# Patient Record
Sex: MALE | Race: WHITE | NOT HISPANIC OR LATINO | Employment: UNEMPLOYED | URBAN - METROPOLITAN AREA
[De-identification: names, ages, dates, MRNs, and addresses within clinical notes are randomized per-mention and may not be internally consistent; named-entity substitution may affect disease eponyms.]

---

## 2022-07-02 ENCOUNTER — APPOINTMENT (OUTPATIENT)
Dept: LAB | Facility: CLINIC | Age: 57
End: 2022-07-02
Payer: COMMERCIAL

## 2022-07-02 DIAGNOSIS — Z00.00 ROUTINE GENERAL MEDICAL EXAMINATION AT A HEALTH CARE FACILITY: ICD-10-CM

## 2022-07-02 LAB
ALBUMIN SERPL BCP-MCNC: 3.6 G/DL (ref 3.5–5)
ALP SERPL-CCNC: 61 U/L (ref 46–116)
ALT SERPL W P-5'-P-CCNC: 21 U/L (ref 12–78)
ANION GAP SERPL CALCULATED.3IONS-SCNC: 4 MMOL/L (ref 4–13)
AST SERPL W P-5'-P-CCNC: 21 U/L (ref 5–45)
BASOPHILS # BLD AUTO: 0.09 THOUSANDS/ΜL (ref 0–0.1)
BASOPHILS NFR BLD AUTO: 1 % (ref 0–1)
BILIRUB SERPL-MCNC: 1.1 MG/DL (ref 0.2–1)
BUN SERPL-MCNC: 18 MG/DL (ref 5–25)
CALCIUM SERPL-MCNC: 8.8 MG/DL (ref 8.3–10.1)
CHLORIDE SERPL-SCNC: 108 MMOL/L (ref 100–108)
CHOLEST SERPL-MCNC: 265 MG/DL
CO2 SERPL-SCNC: 28 MMOL/L (ref 21–32)
CREAT SERPL-MCNC: 0.93 MG/DL (ref 0.6–1.3)
EOSINOPHIL # BLD AUTO: 0.43 THOUSAND/ΜL (ref 0–0.61)
EOSINOPHIL NFR BLD AUTO: 4 % (ref 0–6)
ERYTHROCYTE [DISTWIDTH] IN BLOOD BY AUTOMATED COUNT: 13.9 % (ref 11.6–15.1)
GFR SERPL CREATININE-BSD FRML MDRD: 92 ML/MIN/1.73SQ M
GLUCOSE P FAST SERPL-MCNC: 85 MG/DL (ref 65–99)
HCT VFR BLD AUTO: 44.7 % (ref 36.5–49.3)
HDLC SERPL-MCNC: 38 MG/DL
HGB BLD-MCNC: 15 G/DL (ref 12–17)
IMM GRANULOCYTES # BLD AUTO: 0.03 THOUSAND/UL (ref 0–0.2)
IMM GRANULOCYTES NFR BLD AUTO: 0 % (ref 0–2)
LDLC SERPL CALC-MCNC: 204 MG/DL (ref 0–100)
LYMPHOCYTES # BLD AUTO: 4.67 THOUSANDS/ΜL (ref 0.6–4.47)
LYMPHOCYTES NFR BLD AUTO: 38 % (ref 14–44)
MCH RBC QN AUTO: 29.3 PG (ref 26.8–34.3)
MCHC RBC AUTO-ENTMCNC: 33.6 G/DL (ref 31.4–37.4)
MCV RBC AUTO: 87 FL (ref 82–98)
MONOCYTES # BLD AUTO: 1.44 THOUSAND/ΜL (ref 0.17–1.22)
MONOCYTES NFR BLD AUTO: 12 % (ref 4–12)
NEUTROPHILS # BLD AUTO: 5.58 THOUSANDS/ΜL (ref 1.85–7.62)
NEUTS SEG NFR BLD AUTO: 45 % (ref 43–75)
NONHDLC SERPL-MCNC: 227 MG/DL
NRBC BLD AUTO-RTO: 0 /100 WBCS
PLATELET # BLD AUTO: 281 THOUSANDS/UL (ref 149–390)
PMV BLD AUTO: 11 FL (ref 8.9–12.7)
POTASSIUM SERPL-SCNC: 4.4 MMOL/L (ref 3.5–5.3)
PROT SERPL-MCNC: 7 G/DL (ref 6.4–8.2)
RBC # BLD AUTO: 5.12 MILLION/UL (ref 3.88–5.62)
SODIUM SERPL-SCNC: 140 MMOL/L (ref 136–145)
TRIGL SERPL-MCNC: 117 MG/DL
WBC # BLD AUTO: 12.24 THOUSAND/UL (ref 4.31–10.16)

## 2022-07-02 PROCEDURE — 84445 ASSAY OF TSI GLOBULIN: CPT

## 2022-07-02 PROCEDURE — 36415 COLL VENOUS BLD VENIPUNCTURE: CPT

## 2022-07-02 PROCEDURE — 85025 COMPLETE CBC W/AUTO DIFF WBC: CPT

## 2022-07-02 PROCEDURE — 80053 COMPREHEN METABOLIC PANEL: CPT

## 2022-07-02 PROCEDURE — 80061 LIPID PANEL: CPT

## 2022-07-05 LAB — TSI SER-ACNC: <0.1 IU/L (ref 0–0.55)

## 2022-11-27 ENCOUNTER — HOSPITAL ENCOUNTER (OUTPATIENT)
Dept: RADIOLOGY | Facility: HOSPITAL | Age: 57
Discharge: HOME | End: 2022-11-27
Attending: INTERNAL MEDICINE

## 2022-11-27 DIAGNOSIS — M54.50 LUMBAR PAIN: ICD-10-CM

## 2023-01-17 ENCOUNTER — EVALUATION (OUTPATIENT)
Dept: PHYSICAL THERAPY | Facility: REHABILITATION | Age: 58
End: 2023-01-17

## 2023-01-17 DIAGNOSIS — M54.16 LUMBAR RADICULOPATHY, CHRONIC: Primary | ICD-10-CM

## 2023-01-17 NOTE — PROGRESS NOTES
PT Evaluation     Today's date: 2023  Patient name: Jorge Concepcion  : 1965  MRN: [id number]  Referring provider: Sho Ko MD  Dx:   Encounter Diagnosis     ICD-10-CM    1  Lumbar radiculopathy, chronic  M54 16         Start Time: 6463  Stop Time: 1275  Total time in clinic (min): 50 minutes  Assessment  Assessment details: Problem List:  1) Neural sensitivity  2) lumbar spine hypomobility    Jorge Concepcion is a pleasant 56 y o  male who presents with chronic back pain and lumbar radiculopathy  he has neural tension, and peritonealization with movement resulting in worry over not knowing what's wrong, concern at no signs of improvement, wanting to avoid surgery, fear of not being able to keep active and future ill health (and wanting to prevent it)  No further referral appears necessary at this time based upon examination results  I expect he will benefit from skilled physical therapy  Positive prognostic indicators include positive attitude toward recovery  Negative prognostic indicators include chronicity of symptoms, anxiety, high symptom irritability, degree of peripheralization, minimal changes in pain with movement and dependency on medications  Comparable signs:  1) Straight leg raises  2) Sitting 5 minutes  Impairments: abnormal muscle firing, abnormal or restricted ROM, activity intolerance, impaired physical strength, lacks appropriate home exercise program, pain with function and poor posture     Symptom irritability: highUnderstanding of Dx/Px/POC: good   Prognosis: good    Goals  Short Term Goals:   1  Patient will demonstrate independence with HEP by providing return demonstration of exercises  2  Patient will report decreased symptom intensity during activity by 50%  3  Patient will stand for 20 minutes before onset of symptoms  4  Patient will sit for 10-15 minutes before onset of symptoms    Long Term Goals:   1  Patient will improve FOTO to greater then goal  2  Patient will improve pain with activity to 2/10 or less  3  Patient will continue with HEP to allow for continued progress and function  4  Patient will report moderate pain after full workday  5  Patient will walk for 1 mile without onset of symptoms  Plan  Patient would benefit from: skilled physical therapy  Referral necessary: No  Planned modality interventions: biofeedback, TENS, traction, cryotherapy and thermotherapy: hydrocollator packs  Planned therapy interventions: joint mobilization, manual therapy, muscle pump exercises, neuromuscular re-education, patient education, strengthening, stretching, therapeutic activities, therapeutic exercise, home exercise program, graded exercise, graded activity, gait training, functional ROM exercises, flexibility, body mechanics training, balance, abdominal trunk stabilization, postural training and graded motor  Frequency: 2x week  Duration in visits: 12  Duration in weeks: 6  Treatment plan discussed with: patient and family        Subjective Evaluation    History of Present Illness  Mechanism of injury: Lilyan Opitz says he has been dealing with chronic pain in his back since he was in his 31's  He fell over 30 feet onto a sheet and tore the, "sheeting" on his sacroiliac joint  Currently, he says he gets pain from the top of his hip bones down to the bottom of his feet on both sides, and radiates up into his thoracic spine  He gets numbness, pins and needles and feels a burning when he sit, stands, and walks  He is very active for work, and 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"  He does a lot of bending, lifting, stooping and twisting for work  He states he gets a constant pain, but notes he forgets about it sometimes  He says he is currently taking meloxicam 750 3x/ day and robaxin                 Recurrent probem    Quality of life: fair    Pain  Current pain ratin  At best pain ratin  At worst pain ratin  Quality: radiating, burning, dull ache and throbbing  Relieving factors: change in position and medications  Aggravating factors: walking, standing, stair climbing, sitting and lifting  Progression: worsening      Diagnostic Tests  X-ray: abnormal  Treatments  Previous treatment: chiropractic and medication  Current treatment: massage and medication  Patient Goals  Patient goals for therapy: decreased pain, increased motion, increased strength, independence with ADLs/IADLs and return to sport/leisure activities  Patient goal: Avoid surgery        Objective    Posture: decreased lumbar lordosis  Palpation: Hypomobility L1-L5, tender to palpation with CPA of sacrum, TTP bilateral PSIS  Myotomes No weakness bilaterally  Dermatome: No dermatomal loss     Reflexes:  (L/R) L3-4: 2+        S1:  1+          Babinski= Negative     Lumbar  % of normal   Flex  100   Extn  50   Repeated Movements  Standing:  extension= exacerbation of back pain  Flexion= peripheralize        MMT         AROM          PROM    Hip       L       R        L           R      L     R   Flex   4/5 4/5                knee         flex 4/5 4/5       ext 5/5 5/5           Neuro Dynamic Testing:  Straight leg raise:   L=  Positive    R=    Positive         Segmental mobility:   LS=  Hypomobile   TS=  Hypomobile                 Precautions: Standard      Manuals             Nerve slide LM                                                   Neuro Re-Ed             Supine nerve slide 2x5                                                                                           Ther Ex                                                                                                                     Ther Activity                                       Gait Training                                       Modalities

## 2023-01-17 NOTE — LETTER
2023    Fina Valenzuela, [address] [place]     Patient: Alex Alberts   YOB: 1965   Date of Visit: 2023     Encounter Diagnosis     ICD-10-CM    1  Lumbar radiculopathy, chronic  M54 16           Dear Dr Lilliana Duque:    Thank you for your recent referral of Alex Alberts  Please review the attached evaluation summary from Uche's recent visit  Please verify that you agree with the plan of care by signing the attached order  If you have any questions or concerns, please do not hesitate to call  I sincerely appreciate the opportunity to share in the care of one of your patients and hope to have another opportunity to work with you in the near future  Sincerely,    Fermín Ash, PT      Referring Provider:      I certify that I have read the below Plan of Care and certify the need for these services furnished under this plan of treatment while under my care  Fina Valenzuela MD  [address] [place] [address]  Via Fax: [phone]          PT Evaluation     Today's date: 2023  Patient name: Alex Alberts  : 1965  MRN: [id number]  Referring provider: Marian Kohli MD  Dx:   Encounter Diagnosis     ICD-10-CM    1  Lumbar radiculopathy, chronic  M54 16         Start Time: 512  Stop Time:   Total time in clinic (min): 50 minutes  Assessment  Assessment details: Problem List:  1) Neural sensitivity  2) lumbar spine hypomobility    Alex Alberts is a pleasant 56 y o  male who presents with chronic back pain and lumbar radiculopathy  he has neural tension, and peritonealization with movement resulting in worry over not knowing what's wrong, concern at no signs of improvement, wanting to avoid surgery, fear of not being able to keep active and future ill health (and wanting to prevent it)  No further referral appears necessary at this time based upon examination results    I expect he will benefit from skilled physical therapy  Positive prognostic indicators include positive attitude toward recovery  Negative prognostic indicators include chronicity of symptoms, anxiety, high symptom irritability, degree of peripheralization, minimal changes in pain with movement and dependency on medications  Comparable signs:  1) Straight leg raises  2) Sitting 5 minutes  Impairments: abnormal muscle firing, abnormal or restricted ROM, activity intolerance, impaired physical strength, lacks appropriate home exercise program, pain with function and poor posture     Symptom irritability: highUnderstanding of Dx/Px/POC: good   Prognosis: good    Goals  Short Term Goals:   1  Patient will demonstrate independence with HEP by providing return demonstration of exercises  2  Patient will report decreased symptom intensity during activity by 50%  3  Patient will stand for 20 minutes before onset of symptoms  4  Patient will sit for 10-15 minutes before onset of symptoms    Long Term Goals:   1  Patient will improve FOTO to greater then goal  2  Patient will improve pain with activity to 2/10 or less  3  Patient will continue with HEP to allow for continued progress and function  4  Patient will report moderate pain after full workday  5  Patient will walk for 1 mile without onset of symptoms         Plan  Patient would benefit from: skilled physical therapy  Referral necessary: No  Planned modality interventions: biofeedback, TENS, traction, cryotherapy and thermotherapy: hydrocollator packs  Planned therapy interventions: joint mobilization, manual therapy, muscle pump exercises, neuromuscular re-education, patient education, strengthening, stretching, therapeutic activities, therapeutic exercise, home exercise program, graded exercise, graded activity, gait training, functional ROM exercises, flexibility, body mechanics training, balance, abdominal trunk stabilization, postural training and graded motor  Frequency: 2x week  Duration in visits: 12  Duration in weeks: 6  Treatment plan discussed with: patient and family        Subjective Evaluation    History of Present Illness  Mechanism of injury: Sylvie Florez says he has been dealing with chronic pain in his back since he was in his 31's  He fell over 30 feet onto a sheet and tore the, "sheeting" on his sacroiliac joint  Currently, he says he gets pain from the top of his hip bones down to the bottom of his feet on both sides, and radiates up into his thoracic spine  He gets numbness, pins and needles and feels a burning when he sit, stands, and walks  He is very active for work, and 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"  He does a lot of bending, lifting, stooping and twisting for work  He states he gets a constant pain, but notes he forgets about it sometimes  He says he is currently taking meloxicam 750 3x/ day and robaxin  Recurrent probem    Quality of life: fair    Pain  Current pain ratin  At best pain ratin  At worst pain ratin  Quality: radiating, burning, dull ache and throbbing  Relieving factors: change in position and medications  Aggravating factors: walking, standing, stair climbing, sitting and lifting  Progression: worsening      Diagnostic Tests  X-ray: abnormal  Treatments  Previous treatment: chiropractic and medication  Current treatment: massage and medication  Patient Goals  Patient goals for therapy: decreased pain, increased motion, increased strength, independence with ADLs/IADLs and return to sport/leisure activities  Patient goal: Avoid surgery        Objective    Posture: decreased lumbar lordosis  Palpation: Hypomobility L1-L5, tender to palpation with CPA of sacrum, TTP bilateral PSIS  Myotomes No weakness bilaterally  Dermatome: No dermatomal loss     Reflexes:  (L/R) L3-4: 2+        S1:  1+          Babinski= Negative     Lumbar  % of normal   Flex  100   Extn   48 Repeated Movements  Standing:  extension= exacerbation of back pain  Flexion= peripheralize        MMT         AROM          PROM    Hip       L       R        L           R      L     R   Flex   4/5 4/5                knee         flex 4/5 4/5       ext 5/5 5/5           Neuro Dynamic Testing:  Straight leg raise:   L=  Positive    R=    Positive         Segmental mobility:   LS=  Hypomobile   TS=  Hypomobile                Precautions: Standard      Manuals 1/17            Nerve slide LM                                                   Neuro Re-Ed             Supine nerve slide 2x5                                                                                           Ther Ex                                                                                                                     Ther Activity                                       Gait Training                                       Modalities

## 2023-01-23 ENCOUNTER — APPOINTMENT (OUTPATIENT)
Dept: PHYSICAL THERAPY | Facility: REHABILITATION | Age: 58
End: 2023-01-23

## 2023-01-30 ENCOUNTER — APPOINTMENT (OUTPATIENT)
Dept: PHYSICAL THERAPY | Facility: REHABILITATION | Age: 58
End: 2023-01-30

## 2023-02-28 ENCOUNTER — OFFICE VISIT (OUTPATIENT)
Dept: SURGERY | Facility: CLINIC | Age: 58
End: 2023-02-28

## 2023-02-28 DIAGNOSIS — L91.8 FIBROEPITHELIAL POLYP: ICD-10-CM

## 2023-02-28 DIAGNOSIS — L98.9 SKIN LESION: Primary | ICD-10-CM

## 2023-02-28 NOTE — PROGRESS NOTES
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  Is been present over the last 20 years  It has enlarged in size  After consultation this was excised  It measures 5 x 3 cm in size  Margins are 0  The wound was closed with 3-0 Vicryl subcutaneous followed by 4 Monocryl suture and Dermabond  Patient tolerated this procedure well  All questions answered  There are no diagnoses linked to this encounter  Subjective:      Patient ID: Nhi Deleon is a 56 y o  male  Presents for evaluation of right buttock skin lesion  Present 20 years,getting larger  The following portions of the patient's history were reviewed and updated as appropriate:     He  has no past medical history on file  He  has no past surgical history on file  His family history is not on file  He  reports that he has been smoking  He does not have any smokeless tobacco history on file  He reports that he does not drink alcohol and does not use drugs  Current Outpatient Medications   Medication Sig Dispense Refill   • meclizine (ANTIVERT) 25 mg tablet Take 1 tablet by mouth 3 (three) times a day as needed for dizziness  30 tablet 0   • nicotine (NICODERM CQ) 14 mg/24hr TD 24 hr patch Place 1 patch on the skin every 24 hours  28 patch 0     No current facility-administered medications for this visit  He is allergic to sulfa antibiotics       Review of Systems      Objective: There were no vitals taken for this visit  Physical Exam    Biopsy    Date/Time: 2/28/2023 6:00 PM  Performed by: Josh Shine MD  Authorized by: Josh Shine MD   Universal Protocol:  Patient understanding: patient states understanding of the procedure being performed      Procedure Details - Lesion Biopsy:      Body area:  Lower extremity    Lower extremity location:  R buttock    Biopsy tissue type: skin and subcutaneous    Initial size (mm):  50    Final defect size (mm):  50 Malignancy: benign lesion

## 2023-11-10 ENCOUNTER — HOSPITAL ENCOUNTER (OUTPATIENT)
Dept: RADIOLOGY | Facility: HOSPITAL | Age: 58
Discharge: HOME | End: 2023-11-10
Attending: ORTHOPAEDIC SURGERY
Payer: COMMERCIAL

## 2023-11-10 ENCOUNTER — OFFICE VISIT (OUTPATIENT)
Dept: OBGYN CLINIC | Facility: HOSPITAL | Age: 58
End: 2023-11-10
Payer: COMMERCIAL

## 2023-11-10 VITALS
WEIGHT: 152.8 LBS | DIASTOLIC BLOOD PRESSURE: 76 MMHG | SYSTOLIC BLOOD PRESSURE: 109 MMHG | HEIGHT: 71 IN | HEART RATE: 84 BPM | BODY MASS INDEX: 21.39 KG/M2

## 2023-11-10 DIAGNOSIS — R52 PAIN: Primary | ICD-10-CM

## 2023-11-10 DIAGNOSIS — R52 PAIN: ICD-10-CM

## 2023-11-10 PROCEDURE — 72110 X-RAY EXAM L-2 SPINE 4/>VWS: CPT

## 2023-11-10 PROCEDURE — 99204 OFFICE O/P NEW MOD 45 MIN: CPT | Performed by: ORTHOPAEDIC SURGERY

## 2023-11-10 RX ORDER — MELOXICAM 15 MG/1
15 TABLET ORAL DAILY
COMMUNITY
Start: 2023-02-09

## 2023-11-10 RX ORDER — METHOCARBAMOL 750 MG/1
750 TABLET, FILM COATED ORAL DAILY
COMMUNITY
Start: 2023-02-09

## 2023-11-10 NOTE — PROGRESS NOTES
Assessment & Plan/Medical Decision Makin y.o. male with Back Pain, Bilateral Radicular Leg Pain, Right Gluteal Pain, and Left Gluteal Pain and imaging findings most notable for L4-5 degenerative spondylolisthesis         The clinical, physical and imaging findings were reviewed with the patient. Rober Le  has a constellation of findings consistent with Lumbar Radiculopathy in the setting of lumbar degenerative disease, L4-5 spondylolisthesis. Ongoing low back and bilateral lower extremity pain that has been worsening and becoming more debilitating. Fortunately patient remains neurologically intact, however functioning has decreased significantly due to pain and symptoms. Physical exam showing decreased lumbar ROM. He has tried formal physical therapy, interventional spine procedures, chiropractic care and oral medications without significant or lasting relief. Given no improvement with multiple avenues of conservative treatment, surgical intervention is warranted at this time. Will tentatively schedule surgery date and have patient return in a few weeks for further surgical discussion and surgical consent. Did advise patient to reach out to his PCP in the interim to schedule pre-operative clearance appointment. Patient instructed to return to office/ER sooner if symptoms are not improving, getting worse, or new worrisome/neurologic symptoms arise. Subjective:      Chief Complaint: Back Pain    HPI:  Oni Ni is a 66 y.o. male presenting for initial visit with chief complaint of back pain. Ongoing back and bilateral posterior leg pain for a few years that has significantly worsened and interfered with functioning over past 1-2 years. Also with ongoing lower extremity cramping and numbness/tingling into bottom of bilateral feet. Pain worse with certain activities and when starting to [facility] after prolonged inactivity.  Pain improves throughout the day, noting he doesn't focus on the pain and is able to continue with his activities however he is still limited. He is able to walk but notes pain is present. Pain is constant that worsens at times to the point he is unable to function. He notes his pain and symptoms have been greatly interfering with his daily activities, functioning, and ability to perform required duties at work. Denies any new pao trauma. Denies fever or chills, no night sweats. Denies any bladder or bowel changes. Denies heart or lung disease. Denies diabetes or kidney disease. 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 Conservative therapy includes the following:   Medications: meloxicam, robaxin without much relief    Injections: [facility], patient had 2 previous lumbar ALESHIA with decreasing efficacy  Physical Therapy: has attempted in past including HEP without relief  Chiropractic Medicine: has attempted in past without much relief  Accupunture/Massage Therapy: has not attempted   These therapeutic modalities were ineffective at providing sustained pain relief/functional improvement. 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 No pertinent family history. History reviewed. No pertinent past medical history. Current Outpatient Medications   Medication Sig Dispense Refill    meloxicam (MOBIC) 15 mg tablet Take 15 mg by mouth daily      methocarbamol (ROBAXIN) 750 mg tablet Take 750 mg by mouth in the morning      meclizine (ANTIVERT) 25 mg tablet Take 1 tablet by mouth 3 (three) times a day as needed for dizziness. 30 tablet 0    nicotine (NICODERM CQ) 14 mg/24hr TD 24 hr patch Place 1 patch on the skin every 24 hours. 28 patch 0     No current facility-administered medications for this visit. History reviewed. No pertinent surgical history.     Social History     Socioeconomic History    Marital status: /Civil Union     Spouse name: Not on file    Number of children: Not on file    Years of education: Not on file    Highest education level: Not on file   Occupational History    Not on file   Tobacco Use    Smoking status: Every Day    Smokeless tobacco: Not on file   Substance and Sexual Activity    Alcohol use: No    Drug use: No    Sexual activity: Not on file   Other Topics Concern    Not on file   Social History Narrative    Not on file     Social Determinants of Health     Financial Resource Strain: Not on file   Food Insecurity: Not on file   Transportation Needs: Not on file   Physical Activity: Not on file   Stress: Not on file   Social Connections: Not on file   Intimate Partner Violence: Not on file   Housing Stability: Not on file       Allergies   Allergen Reactions    Sulfa Antibiotics Other (See Comments)     Throat swelling         Review of Systems  General- denies fever/chills  HEENT- denies hearing loss or sore throat  Eyes- denies eye pain or visual disturbances, denies red eyes  Respiratory- denies cough or SOB  Cardio- denies chest pain or palpitations  GI- denies abdominal pain  Endocrine- denies urinary frequency  Urinary- denies pain with urination  Musculoskeletal- Negative except noted above  Skin- denies rashes or wounds  Neurological- denies dizziness or headache  Psychiatric- denies anxiety or difficulty concentrating    Physical Exam  /76   Pulse 84   Ht 5' 11" (1.803 m)   Wt 69.3 kg (152 lb 12.8 oz)   BMI 21.31 kg/m²     General/Constitutional: No apparent distress: well-nourished and well developed. Lymphatic: No appreciable lymphadenopathy  Respiratory: Non-labored breathing  Vascular: No edema, swelling or tenderness, except as noted in detailed exam.  Integumentary: No impressive skin lesions present, except as noted in detailed exam.  Psych: Normal mood and affect, oriented to person, place and time.   MSK: normal other than stated in HPI and exam  Gait & balance: no evidence of myelopathic gait, ambulates Independently     Lumbar spine range of motion:  -Forward flexion to 90  -Extension to neutral  -Lateral bend 25 right, 25 left  -Rotation 25 right, 25 left  There tenderness with palpation along lumbar paraspinal musculature, no midline tenderness     Neurologic:  Lower Extremity Motor Function    Right  Left    Iliopsoas  5/5  5/5    Quadriceps 5/5 5/5   Tibialis anterior  5/5  5/5    EHL  5/5  5/5    Gastroc. muscle  5/5  5/5    Heel rise  5/5  5/5    Toe rise  5/5  5/5      Sensory: light touch is intact to bilateral upper and lower extremities     Reflexes:    Right Left   Patellar 1+ 1+   Achilles 1+ 1+   Babinski neg neg     Other tests:  Straight Leg Raise: negative  Keon SI: negative  BELINDA SI: negative  Greater troch: no tenderness   Internal/external hip ROM: intact, no pain   Flexion/extension knee ROM: intact, no pain   Vascular: WWP extremities, 2+DP bilateral      Diagnostic Tests   IMAGING: I have personally reviewed the images and these are my findings:  Lumbar Spine X-rays from 11/10/2023: multi level lumbar spondylosis with loss of disc height, osteophyte formation and facet hypertrophy, L4-5 degenerative spondylolisthesis, no appreciated lytic/blastic lesions, no obvious instability    Lumbar Spine MRI from 2/7/2023: multi level lumbar disc degeneration with disc desiccation, loss of disc height, facet and ligamentum hypertrophy, L4-5 degenerative spondylolisthesis, L4-5 central, lateral recess, foraminal stenosis     Electronic Medical Records were reviewed including [facility] office notes, imaging studies. Procedures, if performed today     None performed       Portions of the record may have been created with voice recognition software. Occasional wrong word or "sound a like" substitutions may have occurred due to the inherent limitations of voice recognition software.   Read the chart carefully and recognize, using context, where substitutions have occurred.

## 2023-12-05 ENCOUNTER — HOSPITAL ENCOUNTER (OUTPATIENT)
Dept: RADIOLOGY | Facility: HOSPITAL | Age: 58
Discharge: HOME | End: 2023-12-05
Payer: COMMERCIAL

## 2023-12-05 ENCOUNTER — OFFICE VISIT (OUTPATIENT)
Dept: OBGYN CLINIC | Facility: HOSPITAL | Age: 58
End: 2023-12-05
Payer: COMMERCIAL

## 2023-12-05 VITALS
DIASTOLIC BLOOD PRESSURE: 74 MMHG | BODY MASS INDEX: 21.39 KG/M2 | SYSTOLIC BLOOD PRESSURE: 106 MMHG | WEIGHT: 152.78 LBS | HEIGHT: 71 IN | HEART RATE: 89 BPM

## 2023-12-05 DIAGNOSIS — Z01.818 PRE-OP EVALUATION: ICD-10-CM

## 2023-12-05 DIAGNOSIS — M48.061 SPINAL STENOSIS OF LUMBAR REGION, UNSPECIFIED WHETHER NEUROGENIC CLAUDICATION PRESENT: ICD-10-CM

## 2023-12-05 DIAGNOSIS — M54.16 LUMBAR RADICULOPATHY: Primary | ICD-10-CM

## 2023-12-05 PROCEDURE — 99215 OFFICE O/P EST HI 40 MIN: CPT | Performed by: ORTHOPAEDIC SURGERY

## 2023-12-05 PROCEDURE — 71046 X-RAY EXAM CHEST 2 VIEWS: CPT

## 2023-12-05 RX ORDER — CEFAZOLIN SODIUM 2 G/50ML
2000 SOLUTION INTRAVENOUS ONCE
OUTPATIENT
Start: 2023-12-05 | End: 2023-12-05

## 2023-12-05 RX ORDER — TRANEXAMIC ACID 10 MG/ML
1000 INJECTION, SOLUTION INTRAVENOUS
OUTPATIENT
Start: 2023-12-06 | End: 2023-12-07

## 2023-12-05 RX ORDER — CHLORHEXIDINE GLUCONATE ORAL RINSE 1.2 MG/ML
15 SOLUTION DENTAL ONCE
OUTPATIENT
Start: 2023-12-05 | End: 2023-12-05

## 2023-12-05 NOTE — PROGRESS NOTES
Assessment & Plan/Medical Decision Makin y.o. male with Back Pain, Bilateral Radicular Leg Pain, Right Gluteal Pain, and Left Gluteal Pain and imaging findings most notable for L4-5 degenerative spondylolisthesis         The clinical, physical and imaging findings were reviewed with the patient. Marie Bonner  has a constellation of findings consistent with Lumbar Radiculopathy in the setting of lumbar degenerative disease, L4-5 spondylolisthesis, spinal stenosis. Physical exam showing intact motor and sensation, decreased lumbar ROM. Imaging reviewed including lumbar radiographs, flex/ex films, lumbar MRI. Patient symptoms have been refractory to conservative treatments as detailed below. He continues to have debilitating pain and ambulatory dysfunction. Continues with lower back pain that radiates to bilateral buttocks and posterior lateral lower extremities. Notes significant limitation in daily functioning due to pain and symptoms. He has tried formal physical therapy, interventional spine procedures, chiropractic care and oral medications without significant or lasting relief. We discussed the differential diagnosis including extremity/musculoskeltal pathology, peripheral nerve compression, vascular claudication etc. Uche's symptoms, exam findings and imaging are most consistent with lumbar radiculopathy. In my opinion, no further diagnostic work-up (EMG, etc) is warranted at this time. Discussed treatment options. Reviewed the role of further non operative treatment such as physical therapy, activity modification, medication management and interventional spine procedures. Given that his symptoms have persisted despite these conservative interventions, and now having worsening pain, recommend consideration of surgical intervention. Rizwan Ludwig would like to proceed with lumbar surgery. We did discuss further physical therapy.   Marie Bonner notes significant pain exacerbations with last round of physical therapy but would like to attempt further treatment to strengthen prior to surgery which I agreed with and provided new physical therapy script. We discussed surgical options. In my opinion, would be posterior lumbar decompression L4-5 to address spinal stenosis and posterior lumbar fusion with instrumentation L4-5 and transforaminal lumbar interbody fusion, to address spondylolisthesis, signs of instability, severe foraminal stenosis with mechanical back pain. We reviewed the options - such as instrumented vs non-instrumented, posterolateral vs interbody, local autograft vs ICBG vs allograft +/- biologic/graft extender/graft substitute supplementation for obtaining solid arthrodesis as well as the associated risk/benefit profiles and fusion efficacy as well as the FDA status of the instrumentation and products. We also discussed the role of decompression surgery alone, but given his spondylolisthesis with evidence of instability, severe foraminal stenosis, mechanical back pain we reviewed the role of fusion. After discussion with the patient will plan on posterolateral instrumented fusion, with TLIF and cage, local autograft and allograft with graft extender/ substitute supplementation. Explained at length the rationale for surgical invention and postoperative expectations. We discussed and Marie Bonner expressed his understanding , that in general, spine surgery is more predictive in improving extremity/radicular discomfort rather than axial spine pain, and arresting the progression of spinal cord/nerve dysfunction rather than improving. Will utilize LSO brace immobilization during post operative recovery to help reduce pain by restricting mobility of the trunk and facilitate healing.       I reviewed with the patient possible risks of surgery which included the risk of infection, blood loss, risk of damage to adjacent nerves, vessels organs, risk of spinal fluid leak and meningitis, risk of chronic pain, incomplete resolution of symptoms, instability, adjacent segment degeneration & disease, risk of nonunion and instrumentation failure, disease transmission, need for further surgery, DVT, pulmonary embolism, blindness, paralysis and even death, as well as other risk on consent form. We also discussed patient specific risks and mitigation and associated courtney- and post- operative related risks. We reviewed infection prevention. Reviewed medications; instructed patient medications to stop before surgery (i.e. blood thinners, NSAIDs, DMARDs and vitamins)     Also reviewed with patient our narcotic policy. We will provide medications up to 60 days postop. In the event patient requires more medications, will need to consult their PCP and/or see a pain management physician. I have queried the [place] database for Lin Bañuelos to better assist in clinical decision making regarding prescriptions for controlled medications. After querying the database and considering the clinical picture I have determined that he is a candidate for a prescription for control medication in the initial postoperative period. All questions were answered. Patient verbalized understanding. Informed consent was obtained. Consent form was signed. Ava Hargrove had no further questions. Uche's wife, Es Gtz, was also present who had no further questions. He received a pre-operative chest Xray at today's visit. Orders for preoperative labs (CBC, CMP, PTT, INR, type & screen, and ECG) were also placed at today's visit. Discussed pre-operative clearances, medical optimization and risk stratification. Uche needs pre-operative clearance from PCP and obtain PATs. Pre operative patient reported outcome measures were obtained. Rates back pain at 7/10 and leg pain 10/10. Plan to obtain updated advanced imaging to assess bone architecture and pre op planning.      Patient instructed to return to office/ER sooner if symptoms are not improving, getting worse, or new worrisome/neurologic symptoms arise. Subjective:      Chief Complaint: Back Pain    HPI:  Angie Amaro is a 66 y.o. male presenting for initial visit with chief complaint of back pain. Ongoing back and bilateral posterior leg pain for a few years that has significantly worsened and interfered with functioning over past 1-2 years. Also with ongoing lower extremity cramping and numbness/tingling into bottom of bilateral feet. Pain worse with certain activities and when starting to [facility] after prolonged inactivity. Pain improves throughout the day, noting he doesn't focus on the pain and is able to continue with his activities however he is still limited. He is able to walk but notes pain is present. Pain is constant that worsens at times to the point he is unable to function. He notes his pain and symptoms have been greatly interfering with his daily activities, functioning, and ability to perform required duties at work. Denies any new pao trauma. Denies fever or chills, no night sweats. Denies any bladder or bowel changes. Denies heart or lung disease. Denies diabetes or kidney disease. 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 Conservative therapy includes the following:   Medications: meloxicam, robaxin without much relief    Injections: [facility], patient had 2 previous lumbar ALESHAI with decreasing efficacy  Physical Therapy: has attempted in past including HEP without relief  Chiropractic Medicine: has attempted in past without much relief  Accupunture/Massage Therapy: has not attempted   These therapeutic modalities were ineffective at providing sustained pain relief/functional improvement.      Nicotine dependent: denies currently smoking  Occupation:  and country [employer], limited at work due to pain  Living situation: Lives with family   ADLs: patient is able to perform     Update on 12/5/2023:  Jb Booth is here for follow up, surgical discussion. Continues with worsening back and bilateral posterior leg pain. Also with ongoing lower extremity cramping and numbness/tingling into bottom of bilateral feet. Objective:     History reviewed. No pertinent family history. History reviewed. No pertinent past medical history. Current Outpatient Medications   Medication Sig Dispense Refill    meloxicam (MOBIC) 15 mg tablet Take 15 mg by mouth daily      methocarbamol (ROBAXIN) 750 mg tablet Take 750 mg by mouth in the morning       No current facility-administered medications for this visit. History reviewed. No pertinent surgical history.     Social History     Socioeconomic History    Marital status: /Civil Union     Spouse name: Not on file    Number of children: Not on file    Years of education: Not on file    Highest education level: Not on file   Occupational History    Not on file   Tobacco Use    Smoking status: Every Day    Smokeless tobacco: Not on file   Substance and Sexual Activity    Alcohol use: No    Drug use: No    Sexual activity: Not on file   Other Topics Concern    Not on file   Social History Narrative    Not on file     Social Determinants of Health     Financial Resource Strain: Not on file   Food Insecurity: Not on file   Transportation Needs: Not on file   Physical Activity: Not on file   Stress: Not on file   Social Connections: Not on file   Intimate Partner Violence: Not on file   Housing Stability: Not on file       Allergies   Allergen Reactions    Sulfa Antibiotics Other (See Comments)     Throat swelling         Review of Systems  General- denies fever/chills  HEENT- denies hearing loss or sore throat  Eyes- denies eye pain or visual disturbances, denies red eyes  Respiratory- denies cough or SOB  Cardio- denies chest pain or palpitations  GI- denies abdominal pain  Endocrine- denies urinary frequency  Urinary- denies pain with urination  Musculoskeletal- Negative except noted above  Skin- denies rashes or wounds  Neurological- denies dizziness or headache  Psychiatric- denies anxiety or difficulty concentrating    Physical Exam  /74   Pulse 89   Ht 5' 11" (1.803 m)   Wt 69.3 kg (152 lb 12.5 oz)   BMI 21.31 kg/m²     General/Constitutional: No apparent distress: well-nourished and well developed. Lymphatic: No appreciable lymphadenopathy  Respiratory: Non-labored breathing  Vascular: No edema, swelling or tenderness, except as noted in detailed exam.  Integumentary: No impressive skin lesions present, except as noted in detailed exam.  Psych: Normal mood and affect, oriented to person, place and time.   MSK: normal other than stated in HPI and exam  Gait & balance: no evidence of myelopathic gait, ambulates Independently     Lumbar spine range of motion:  -Forward flexion to 60  -Extension to neutral  -Lateral bend 15 right, 25 left  -Rotation 15 right, 15 left  There is mild tenderness with palpation along lumbar paraspinal musculature, no midline tenderness     Neurologic:  Lower Extremity Motor Function    Right  Left    Iliopsoas  5/5  5/5    Quadriceps 5/5 5/5   Tibialis anterior  5/5  5/5    EHL  5/5  5/5    Gastroc. muscle  5/5  5/5    Heel rise  5/5  5/5    Toe rise  5/5  5/5      Sensory: light touch is intact to bilateral upper and lower extremities     Reflexes:    Right Left   Patellar 1+ 1+   Achilles 1+ 1+   Babinski neg neg     Other tests:  Straight Leg Raise: negative  Keon SI: negative  BELINDA SI: negative  Greater troch: no tenderness   Internal/external hip ROM: intact, no pain   Flexion/extension knee ROM: intact, no pain   Vascular: WWP extremities, 2+DP bilateral      Diagnostic Tests   IMAGING: I have personally reviewed the images and these are my findings:  Lumbar Spine X-rays from 11/10/2023: multi level lumbar spondylosis with loss of disc height, osteophyte formation and facet hypertrophy, L4-5 degenerative spondylolisthesis, no appreciated lytic/blastic lesions, flex/ex films with signs of instability at L4-5    Lumbar Spine MRI from 2/7/2023: open mri quality images, multi level lumbar disc degeneration with disc desiccation, loss of disc height, facet and ligamentum hypertrophy, L4-5 degenerative spondylolisthesis, L4-5 central and lateral recess, severe right sided foraminal stenosis (patient notes he can no have non-open mri due to extreme claustrophobia and does not want to take any anxiolytics due to previous addictions)    Electronic Medical Records were reviewed including [facility] office notes, imaging studies/reports. Procedures, if performed today     None performed       Portions of the record may have been created with voice recognition software. Occasional wrong word or "sound a like" substitutions may have occurred due to the inherent limitations of voice recognition software. Read the chart carefully and recognize, using context, where substitutions have occurred.

## 2023-12-11 ENCOUNTER — HOSPITAL ENCOUNTER (OUTPATIENT)
Dept: RADIOLOGY | Facility: HOSPITAL | Age: 58
Discharge: HOME | End: 2023-12-11
Payer: COMMERCIAL

## 2023-12-11 DIAGNOSIS — M48.061 SPINAL STENOSIS OF LUMBAR REGION, UNSPECIFIED WHETHER NEUROGENIC CLAUDICATION PRESENT: ICD-10-CM

## 2023-12-11 DIAGNOSIS — M54.16 LUMBAR RADICULOPATHY: ICD-10-CM

## 2023-12-11 PROCEDURE — 72131 CT LUMBAR SPINE W/O DYE: CPT

## 2023-12-11 PROCEDURE — G1004 CDSM NDSC: HCPCS

## 2023-12-13 ENCOUNTER — EVALUATION (OUTPATIENT)
Dept: PHYSICAL THERAPY | Facility: REHABILITATION | Age: 58
End: 2023-12-13
Payer: COMMERCIAL

## 2023-12-13 DIAGNOSIS — M48.061 SPINAL STENOSIS OF LUMBAR REGION, UNSPECIFIED WHETHER NEUROGENIC CLAUDICATION PRESENT: ICD-10-CM

## 2023-12-13 DIAGNOSIS — M54.16 LUMBAR RADICULOPATHY: Primary | ICD-10-CM

## 2023-12-13 PROCEDURE — 97162 PT EVAL MOD COMPLEX 30 MIN: CPT

## 2023-12-13 PROCEDURE — 97530 THERAPEUTIC ACTIVITIES: CPT

## 2023-12-13 NOTE — PROGRESS NOTES
PT Evaluation     Today's date: 2023  Patient name: Jose Yoder  : 1965  MRN: [id number]  Referring provider: Dulcie Bamberger, MD  Dx:   Encounter Diagnosis     ICD-10-CM    1. Lumbar radiculopathy  M54.16 Ambulatory referral to Physical Therapy     PT plan of care cert/re-cert      2. Spinal stenosis of lumbar region, unspecified whether neurogenic claudication present  M48.061 Ambulatory referral to Physical Therapy     PT plan of care cert/re-cert          Start Time: 1615  Stop Time: 1703  Total time in clinic (min): 48 minutes    Assessment  Assessment details: Jose Yoder Is a 66 y.o. male who presents to physical therapy with reports of lower back pain that radiates into bilateral lower extremities. Subjective report, x-ray results and examination findings are consistent with lumbar radiculopathy, L4-5 spondylolisthesis, and spinal stenosis. Examination findings include limited and painful lumbar AROM, diminished sensation of BLE, decreased hip strength, poor posture, abnormal gait, and positive slump and SLR tests bilaterally. These deficits are limiting pt's ability to peform normal ADLS like walking, climbing stairs, and lifting objects, as well as all of his typical work duties. Pt will benefit from skilled outpatient PT to address the below stated impairments, to address therapy goals, to reduce pain, and improve function. Therapist explained to pt: findings of IE, rehab diagnosis, and POC. Pt-centered goals reviewed and confirmed by pt. Pt also expressed satisfaction that their current concerns were addressed at the end of the session.     Impairments: abnormal gait, abnormal or restricted ROM, activity intolerance, impaired physical strength, lacks appropriate home exercise program, pain with function and poor posture   Understanding of Dx/Px/POC: good   Prognosis: fair    Goals  STGs (1-2 weeks):  Pt independent with initial HEP, rationale, technique and frequency, for ROM and pain control. Pt will report at least a 10% reduction in subjective pain complaints/symptoms to better manage ADLs and work duties. LTGs (4-5 weeks):  Pt will have 25% increase in pain free lumbar ROM to better manage ADLs and work duties. Pt will report a 35% or > reduction in subjective pain complaints/symptoms to better manage ADLs and work duties. Improve B hip MMTs by at least half a grade for improved lumbopelvic stability prior to surgery. Pt independent with rationale, technique and plan for performance of advanced HEP to ensure independent self-management of symptoms upon discharge prior to surgery. Plan  Plan details: Pt only coming in 1x/week due to financial reasons. Patient would benefit from: skilled physical therapy  Planned modality interventions: cryotherapy, low level laser therapy and thermotherapy: hydrocollator packs  Planned therapy interventions: abdominal trunk stabilization, flexibility, functional ROM exercises, home exercise program, IASTM, joint mobilization, manual therapy, massage, motor coordination training, nerve gliding, neuromuscular re-education, patient education, postural training, strengthening, stretching, therapeutic activities and therapeutic exercise  Frequency: 1x week  Duration in weeks: 5  Plan of Care beginning date: 12/13/2023  Plan of Care expiration date: 1/17/2024  Treatment plan discussed with: patient        Subjective Evaluation    History of Present Illness  Mechanism of injury: Pt presents to physical therapy with reports of low back pain that radiates into BLE down to his feet that he has had for years with unknown mechanism of injury. His pain began to worsen a little about 5 years ago, and 2 years ago is when it began to significantly increase. He reports that he is able to perform many of his daily life and work tasks by pushing through the pain.  However, he is significantly limited by the pain and delegates the jobs at work, that he knows will hurt his back like heavy lifting, to other employees. Experiences numbness, tingling and burning down both legs. He wears a [employer] lumbar support belt which he says provides him with some support and minimal pain improvement. He is able to sleep through the night and does not report bowel and bladder issues. Pt is scheduled to have surgery on his lumbar spine on 1/18/23. He reports that he tried physical therapy one year ago for his back, and only went to one or two sessions because the exercises he did caused him so much pain that he had to miss two days of work. He reports that he didn't want to come to physical therapy again, and that he only came today for insurance purposes. When he was 24 he fell 30 ft and landed on a fiberglass deck. He had a "concussion and tore the sheathing in his coccyx." Pt reports that the doctors do not believe that this incident is related to his current injury. He had been going to the chiropractor every couple years for back pain from the early 2000s until around 2019 when his chiropractor retired. A new chiropractor took over his practice, but the pt reports that he was unable to improve his pain the way his old chiropractor could. He had steroid shots in his back in April 2023 with relieving effects that lasted about 3-4 months, and one in July 2023 that only lasted about one month. He reports he has occasional moments without pain that only last a few seconds and then his pain returns. He reports that he is taking Meloxicam which only improves his pain slightly. He reports he is 26 years sober. X-ray results of lumbar spine on 11/12/23: Mild multilevel lumbar spine facet hypertrophic change at L4-L5 and L5-S1. Stable slight degenerative anterolisthesis of L4 on L5. CT scan of lumbar spine performed on 12/11/23. Results pending review.   Patient Goals  Patient goals for therapy: decreased pain, increased motion, increased strength and independence with ADLs/IADLs  Patient goal: His main goal is to not miss work the day after his physical therapy appointments. Pain  Current pain ratin  At best pain rating: 3  At worst pain rating: 10  Location: around L4-S1 and radiates all the way down both legs  Quality: burning, radiating, sharp and needle-like  Relieving factors: rest (Laying on his back and his side. Meloxicam. 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 He does a lot of walking and heavy lifting at work.  Works around 8 hours/day)      Objective    Objective  Postural Observation   Sitting: kyphotic due to pain  Standing: kyphotic due to pain    Myotomes  Right Hip Flexion (L1-3): (4/5)  Left Hip Flexion (L1-3): (4/5)  R Knee Ext (L3-4): (4+/5)  L Knee Ext (L3-4): (4+/5)  R DF (L4): (4+/5)  L DF (L4): (4+/5)  R EDL (L5): (4+/5)  L EDL (L5): (4+/5)  R Knee Flex (S1-2): (4+/5)  L Knee Flex (S1-2): (4+/5)    Dermatomes  R Anteromedial Thigh (L2): intact  L Anteromedial Thigh (L2): intact  R Medial Knee (L3): intact  L Medial Knee (L3): intact  R Ant Knee, Medial Leg (L4): diminished  L Ant Knee, Medial Leg (L4): diminished  R Foot Dorsum (L5): diminished  L Foot Dorsum (L5): diminished  R Plantar/Lat Foot (S1): diminished  L Plantar/Lat Foot (S1): diminished  R Posteromedial Thigh (S2): diminished  L Posteromedial Thigh (S2): diminished    Reflexes  R Patellar Reflex: (2+)  L Patellar Reflex: (2+)  R Achilles Reflex: (2+)  L Achilles Reflex: (2+)    Neurodynamic Testing  Slump Test: + bilaterally  SLR: + bilaterally     Lumbar Active Range of Motion  Movement Loss Symptoms Aidan Mod Min Nil Symptoms   Flexion   minimal  Increase in low back pain and radiating symptoms   Extension   minimal  Increase in low back pain and radiating symptoms   R SB   minimal  Increase in low back pain and radiating symptoms   L SB   minimal  Increase in low back pain and radiating symptoms     ? Ambulation  Antalgic gait with forward flexed posture at hips.        Precautions: Standard      Manuals 12/13            Assessment Lumbar spine, repeated movements and hip strength nv*                                                   Neuro Re-Ed             Sciatic nerve glides                                                                                           Ther Ex             Hamstring stretch             Prone press-up             SLR                                                                              Ther Activity             Pt education Findings, POC                         Gait Training                                       Modalities

## 2023-12-20 ENCOUNTER — OFFICE VISIT (OUTPATIENT)
Dept: PHYSICAL THERAPY | Facility: REHABILITATION | Age: 58
End: 2023-12-20
Payer: COMMERCIAL

## 2023-12-20 DIAGNOSIS — M48.061 SPINAL STENOSIS OF LUMBAR REGION, UNSPECIFIED WHETHER NEUROGENIC CLAUDICATION PRESENT: ICD-10-CM

## 2023-12-20 DIAGNOSIS — M54.16 LUMBAR RADICULOPATHY: Primary | ICD-10-CM

## 2023-12-20 PROCEDURE — 97110 THERAPEUTIC EXERCISES: CPT

## 2023-12-20 PROCEDURE — 97140 MANUAL THERAPY 1/> REGIONS: CPT

## 2023-12-20 NOTE — PROGRESS NOTES
Daily Note     Today's date: 2023  Patient name: Uche Burnett  : 1965  MRN: [id number]  Referring provider: Jamey Altman MD  Dx:   Encounter Diagnosis     ICD-10-CM    1. Lumbar radiculopathy  M54.16       2. Spinal stenosis of lumbar region, unspecified whether neurogenic claudication present  M48.061           Start Time: 1602  Stop Time: 1645  Total time in clinic (min): 43 minutes    Subjective: Pt reported that he had a lot more lower back, hip and bilateral leg pain after his initial evaluation on 23. He reports that even very small movements of his hips increase the burning and painful sensations that travel down his legs. Pt reported that he is only attending physical therapy prior to his surgery because his insurance is requiring him to do so. He reports that he is upset because he does not want to pay for something that he feels is only going to worsen his symptoms. He does report that he will be very willing to participate in physical therapy once he has his back surgery if it is needed to improve his function.      Objective: See treatment diary below      Muscle Activation   Patient able to activate left transverse abdominals and right transverse abdominals.    Lumbar protective mechanism: + orly. with anterior and posterior force applied only.     Arm raise test: negative, demonstrates good activation of multifidi bilaterally; increased lower back pain and burning sensation going down both legs           MMT     Hip  L  R   ext At least 3-/5 At least 3-/5      abd At least 3-/5 At least 3-/5     *Unable to perform hip extension and abduction MMTs due to pt declining as pt reports this will cause him more pain. Based on hip AROM in standing and sitting.    Assessment: Tolerated treatment poor. Based on above objective measures, pt demonstrates decreased trunk, abdominal and hip strength leading to decreased trunk stabilization which is worsening his symptoms. Pt declined to  perform hip MMT movements in typical position because the movement would flare up his pain. Pt declined to perform exercises prior to trying them. He felt they would flare up his pain. Required an increased amount of time between each set of the exercises he performed to allow his pain in his back and BLEs to decrease. Therapist discussed future options for therapy due to pt's ability to participate and tolerate exercises. Therapist informed pt that it would likely not be beneficial for him to continue with therapy if he continues to be unable to participate in most exercises suggested. Pt stated that he would like to continue if he does not experience a significant flare in his pain tomorrow. Came to an agreement that if pt had a significant flare up tomorrow that he is going to call and cancel his future appointments and that therapist should discharge him from PT because he does not want to do anything that is going to worsen his symptoms.      Plan: Continue per plan of care.  Progress treatment as tolerated.       Precautions: Standard      Manuals 12/13 12/20           Assessment Lumbar spine, repeated movements and hip strength nv* JY                                                  Neuro Re-Ed             Sciatic nerve glides                                                                                           Ther Ex             Hamstring stretch             Prone press-up             SLR             Pallof press  1x10 ea BTB, increased pain, abdominal activation worsened lower back pain           Seated hip abduction  Cream loop TB 3x5, increased pain with 2nd and 3rd set           Side lying hip abduction  Demonstrated, pt declined due to concern of increased pain           Standing hip extension  Performed 1 rep on each lower extremity and declined due to increased pain                        Ther Activity             Pt education Findings, POC                         Gait Training                                        Modalities

## 2023-12-27 ENCOUNTER — TELEPHONE (OUTPATIENT)
Age: 58
End: 2023-12-27

## 2023-12-27 NOTE — TELEPHONE ENCOUNTER
Caller: Uche     Doctor: Dr Altman     Reason for call:  The patient received a letter about his recent CT. He would like someone to please call to go over this as soon as possible.    Call back#: [phone]

## 2023-12-27 NOTE — TELEPHONE ENCOUNTER
Caller: Patient    Doctor: Agapito    Reason for call:     Patient is asking for a call back on the review of the results from the CT scan taken on 12/11/23, he received a letter on the cat scan asking him to call the office.  Please call him to review.    Call back#: [phone]

## 2023-12-29 ENCOUNTER — LAB REQUISITION (OUTPATIENT)
Dept: LAB | Facility: HOSPITAL | Age: 58
End: 2023-12-29
Payer: COMMERCIAL

## 2023-12-29 DIAGNOSIS — M54.16 RADICULOPATHY, LUMBAR REGION: ICD-10-CM

## 2023-12-29 LAB
ABO GROUP BLD: NORMAL
BLD GP AB SCN SERPL QL: NEGATIVE
RH BLD: POSITIVE
SPECIMEN EXPIRATION DATE: NORMAL

## 2023-12-29 PROCEDURE — 86850 RBC ANTIBODY SCREEN: CPT

## 2023-12-29 PROCEDURE — 86900 BLOOD TYPING SEROLOGIC ABO: CPT

## 2023-12-29 PROCEDURE — 86901 BLOOD TYPING SEROLOGIC RH(D): CPT

## 2024-01-17 ENCOUNTER — ANESTHESIA EVENT (OUTPATIENT)
Dept: PERIOP | Facility: HOSPITAL | Age: 59
End: 2024-01-17
Payer: COMMERCIAL

## 2024-01-17 ENCOUNTER — TELEPHONE (OUTPATIENT)
Age: 59
End: 2024-01-17

## 2024-01-17 NOTE — TELEPHONE ENCOUNTER
Caller: Uche    Doctor: Agapito    Reason for call: patient is calling to talk to tonie about canceled SX for tomorrow.  Please advise patient    Call back#: [phone]

## 2024-01-17 NOTE — TELEPHONE ENCOUNTER
Caller: Spouse/Santa    Doctor: Agapito    Reason for call: Spoke w/Paul who mentioned they were waiting to hear from  for authorization. [facility] Burnett [facility] [employer] [phone]. Would like a call back to discuss options    Call back#: [phone]

## 2024-01-17 NOTE — TELEPHONE ENCOUNTER
Anna and I spoke with patient.    We received notification from the authorization team that patient's surgery with Dr. Altman for tomorrow 1/18 needed to be cancelled, as we do not have approval yet & the cut off time was 2:00 pm.     We made Dr. Altman aware. He will work with Anna to find a new date to move him to.     We called patient to make him aware & patient was very upset. Pt started yelling, saying he stopped taking pain medication to get ready for surgery, he stopped smoking, etc. We tried multiple times to explain that we cannot do surgery if the authorization is not approved yet, because if it is approved after surgery, we do not know if the insurance will cover the cost after the fact, AND he would be responsible for the total cost of surgery if that were to happen. He then started asking for the cost of the surgery, stating his boss is a millionaire & will pay for it & that of course pro jared isn't an option for us. We did not even get to explain . He asked who messed up in the authorization, why it wasn't approved in time. I let him know that we started the authorization in time, but it has not been approved yet; it is out of our hands. Pt insulted Anna's ability to do her job when I jumped in to help because the call continued to escalate.    It became very difficult to speak to patient because he kept talking over us. Anna & I spoke with Nisha, she started to speak to the patient but their call got disconnected.

## 2024-01-17 NOTE — TELEPHONE ENCOUNTER
Caller: Patient    Doctor: Agapito    Reason for call: Patient calling to speak with a surgery scheduler regarding the cxl of his sx for 1/18/24.  Informed the patient that the Practice Admin will be returning his call.    Call back#: n/a

## 2024-01-18 ENCOUNTER — HOSPITAL ENCOUNTER (OUTPATIENT)
Dept: RADIOLOGY | Facility: HOSPITAL | Age: 59
Setting detail: OUTPATIENT SURGERY
Discharge: HOME | End: 2024-01-18
Payer: COMMERCIAL

## 2024-01-18 ENCOUNTER — HOSPITAL ENCOUNTER (OUTPATIENT)
Facility: HOSPITAL | Age: 59
Setting detail: OUTPATIENT SURGERY
Discharge: HOME | End: 2024-01-21
Attending: ORTHOPAEDIC SURGERY | Admitting: ORTHOPAEDIC SURGERY
Payer: COMMERCIAL

## 2024-01-18 ENCOUNTER — ANESTHESIA (OUTPATIENT)
Dept: PERIOP | Facility: HOSPITAL | Age: 59
End: 2024-01-18
Payer: COMMERCIAL

## 2024-01-18 DIAGNOSIS — M48.061 SPINAL STENOSIS OF LUMBAR REGION, UNSPECIFIED WHETHER NEUROGENIC CLAUDICATION PRESENT: ICD-10-CM

## 2024-01-18 DIAGNOSIS — M54.16 LUMBAR RADICULOPATHY: ICD-10-CM

## 2024-01-18 DIAGNOSIS — G89.29 OTHER CHRONIC PAIN: Primary | ICD-10-CM

## 2024-01-18 PROBLEM — F11.90 CHRONIC, CONTINUOUS USE OF OPIOIDS: Status: ACTIVE | Noted: 2024-01-18

## 2024-01-18 LAB
ABO GROUP BLD: NORMAL
RH BLD: POSITIVE

## 2024-01-18 PROCEDURE — 22853 INSJ BIOMECHANICAL DEVICE: CPT | Performed by: ORTHOPAEDIC SURGERY

## 2024-01-18 PROCEDURE — 63052 LAM FACETC/FRMT ARTHRD LUM 1: CPT | Performed by: ORTHOPAEDIC SURGERY

## 2024-01-18 PROCEDURE — C1781 MESH (IMPLANTABLE): HCPCS | Performed by: ORTHOPAEDIC SURGERY

## 2024-01-18 PROCEDURE — C1713 ANCHOR/SCREW BN/BN,TIS/BN: HCPCS | Performed by: ORTHOPAEDIC SURGERY

## 2024-01-18 PROCEDURE — 99024 POSTOP FOLLOW-UP VISIT: CPT | Performed by: ORTHOPAEDIC SURGERY

## 2024-01-18 PROCEDURE — NC001 PR NO CHARGE: Performed by: ORTHOPAEDIC SURGERY

## 2024-01-18 PROCEDURE — 20930 SP BONE ALGRFT MORSEL ADD-ON: CPT | Performed by: ORTHOPAEDIC SURGERY

## 2024-01-18 PROCEDURE — 20936 SP BONE AGRFT LOCAL ADD-ON: CPT | Performed by: ORTHOPAEDIC SURGERY

## 2024-01-18 PROCEDURE — 72100 X-RAY EXAM L-S SPINE 2/3 VWS: CPT

## 2024-01-18 PROCEDURE — 22840 INSERT SPINE FIXATION DEVICE: CPT | Performed by: ORTHOPAEDIC SURGERY

## 2024-01-18 PROCEDURE — 22633 ARTHRD CMBN 1NTRSPC LUMBAR: CPT | Performed by: ORTHOPAEDIC SURGERY

## 2024-01-18 DEVICE — SCREW 54840046540 4.75 ATS MAS 6.5X40
Type: IMPLANTABLE DEVICE | Site: SPINE LUMBAR | Status: FUNCTIONAL
Brand: CD HORIZON® SPINAL SYSTEM

## 2024-01-18 DEVICE — I-FACTOR PUTTY, 2.5CC SYRINGE
Type: IMPLANTABLE DEVICE | Site: SPINE LUMBAR | Status: FUNCTIONAL
Brand: I-FACTOR PEPTIDE ENHANCED BONE GRAFT

## 2024-01-18 DEVICE — I-FACTOR™ PUTTY, 5.0 CC SYRINGE
Type: IMPLANTABLE DEVICE | Site: SPINE LUMBAR | Status: FUNCTIONAL
Brand: I-FACTOR™ PEPTIDE ENHANCED BONE GRAFT

## 2024-01-18 DEVICE — SPACER 84332410 ADAPTIX 24MM X 10MM
Type: IMPLANTABLE DEVICE | Site: SPINE LUMBAR | Status: FUNCTIONAL
Brand: ADAPTIX™ INTERBODY SYSTEM WITH TITAN NANOLOCK™ SURFACE TECHNOLOGY

## 2024-01-18 DEVICE — GRAFT BONE CRUSHED CANC CHIPS 0.1-4MM 30ML FREEZE DRIED: Type: IMPLANTABLE DEVICE | Site: SPINE LUMBAR | Status: FUNCTIONAL

## 2024-01-18 RX ORDER — OXYCODONE HYDROCHLORIDE 5 MG/1
10 TABLET ORAL EVERY 4 HOURS PRN
Status: DISCONTINUED | OUTPATIENT
Start: 2024-01-18 | End: 2024-01-21 | Stop reason: HOSPADM

## 2024-01-18 RX ORDER — CEFAZOLIN SODIUM 2 G/50ML
2000 SOLUTION INTRAVENOUS ONCE
Status: DISCONTINUED | OUTPATIENT
Start: 2024-01-18 | End: 2024-01-18

## 2024-01-18 RX ORDER — MAGNESIUM HYDROXIDE/ALUMINUM HYDROXICE/SIMETHICONE 120; 1200; 1200 MG/30ML; MG/30ML; MG/30ML
30 SUSPENSION ORAL EVERY 6 HOURS PRN
Status: DISCONTINUED | OUTPATIENT
Start: 2024-01-18 | End: 2024-01-21 | Stop reason: HOSPADM

## 2024-01-18 RX ORDER — DIPHENHYDRAMINE HYDROCHLORIDE 50 MG/ML
12.5 INJECTION INTRAMUSCULAR; INTRAVENOUS ONCE AS NEEDED
Status: DISCONTINUED | OUTPATIENT
Start: 2024-01-18 | End: 2024-01-18

## 2024-01-18 RX ORDER — GLYCOPYRROLATE 0.2 MG/ML
INJECTION INTRAMUSCULAR; INTRAVENOUS AS NEEDED
Status: DISCONTINUED | OUTPATIENT
Start: 2024-01-18 | End: 2024-01-18

## 2024-01-18 RX ORDER — CALCIUM CARBONATE 500 MG/1
1000 TABLET, CHEWABLE ORAL DAILY PRN
Status: DISCONTINUED | OUTPATIENT
Start: 2024-01-18 | End: 2024-01-21 | Stop reason: HOSPADM

## 2024-01-18 RX ORDER — SODIUM CHLORIDE 9 MG/ML
INJECTION, SOLUTION INTRAVENOUS CONTINUOUS PRN
Status: DISCONTINUED | OUTPATIENT
Start: 2024-01-18 | End: 2024-01-18

## 2024-01-18 RX ORDER — PROPOFOL 10 MG/ML
INJECTION, EMULSION INTRAVENOUS CONTINUOUS PRN
Status: DISCONTINUED | OUTPATIENT
Start: 2024-01-18 | End: 2024-01-18

## 2024-01-18 RX ORDER — SODIUM CHLORIDE, SODIUM LACTATE, POTASSIUM CHLORIDE, CALCIUM CHLORIDE 600; 310; 30; 20 MG/100ML; MG/100ML; MG/100ML; MG/100ML
100 INJECTION, SOLUTION INTRAVENOUS CONTINUOUS
Status: DISCONTINUED | OUTPATIENT
Start: 2024-01-18 | End: 2024-01-21 | Stop reason: HOSPADM

## 2024-01-18 RX ORDER — LIDOCAINE HYDROCHLORIDE 20 MG/ML
INJECTION, SOLUTION EPIDURAL; INFILTRATION; INTRACAUDAL; PERINEURAL AS NEEDED
Status: DISCONTINUED | OUTPATIENT
Start: 2024-01-18 | End: 2024-01-18

## 2024-01-18 RX ORDER — SENNOSIDES 8.6 MG
1 TABLET ORAL DAILY
Status: DISCONTINUED | OUTPATIENT
Start: 2024-01-19 | End: 2024-01-21 | Stop reason: HOSPADM

## 2024-01-18 RX ORDER — DEXAMETHASONE SODIUM PHOSPHATE 10 MG/ML
INJECTION, SOLUTION INTRAMUSCULAR; INTRAVENOUS AS NEEDED
Status: DISCONTINUED | OUTPATIENT
Start: 2024-01-18 | End: 2024-01-18

## 2024-01-18 RX ORDER — ROCURONIUM BROMIDE 10 MG/ML
INJECTION, SOLUTION INTRAVENOUS AS NEEDED
Status: DISCONTINUED | OUTPATIENT
Start: 2024-01-18 | End: 2024-01-18

## 2024-01-18 RX ORDER — VANCOMYCIN HYDROCHLORIDE 1 G/20ML
INJECTION, POWDER, LYOPHILIZED, FOR SOLUTION INTRAVENOUS AS NEEDED
Status: DISCONTINUED | OUTPATIENT
Start: 2024-01-18 | End: 2024-01-18 | Stop reason: HOSPADM

## 2024-01-18 RX ORDER — BUPIVACAINE HYDROCHLORIDE 2.5 MG/ML
INJECTION, SOLUTION EPIDURAL; INFILTRATION; INTRACAUDAL AS NEEDED
Status: DISCONTINUED | OUTPATIENT
Start: 2024-01-18 | End: 2024-01-18 | Stop reason: HOSPADM

## 2024-01-18 RX ORDER — ONDANSETRON 2 MG/ML
INJECTION INTRAMUSCULAR; INTRAVENOUS AS NEEDED
Status: DISCONTINUED | OUTPATIENT
Start: 2024-01-18 | End: 2024-01-18

## 2024-01-18 RX ORDER — TRANEXAMIC ACID 10 MG/ML
1000 INJECTION, SOLUTION INTRAVENOUS
Status: COMPLETED | OUTPATIENT
Start: 2024-01-18 | End: 2024-01-18

## 2024-01-18 RX ORDER — CEFAZOLIN SODIUM 2 G/50ML
SOLUTION INTRAVENOUS AS NEEDED
Status: DISCONTINUED | OUTPATIENT
Start: 2024-01-18 | End: 2024-01-18

## 2024-01-18 RX ORDER — METHOCARBAMOL 750 MG/1
750 TABLET, FILM COATED ORAL EVERY 6 HOURS SCHEDULED
Status: DISCONTINUED | OUTPATIENT
Start: 2024-01-18 | End: 2024-01-21 | Stop reason: HOSPADM

## 2024-01-18 RX ORDER — ONDANSETRON 2 MG/ML
4 INJECTION INTRAMUSCULAR; INTRAVENOUS EVERY 6 HOURS PRN
Status: DISCONTINUED | OUTPATIENT
Start: 2024-01-18 | End: 2024-01-21 | Stop reason: HOSPADM

## 2024-01-18 RX ORDER — FENTANYL CITRATE/PF 50 MCG/ML
25 SYRINGE (ML) INJECTION
Status: DISCONTINUED | OUTPATIENT
Start: 2024-01-18 | End: 2024-01-18

## 2024-01-18 RX ORDER — PROPOFOL 10 MG/ML
INJECTION, EMULSION INTRAVENOUS AS NEEDED
Status: DISCONTINUED | OUTPATIENT
Start: 2024-01-18 | End: 2024-01-18

## 2024-01-18 RX ORDER — KETAMINE HCL IN NACL, ISO-OSM 100MG/10ML
SYRINGE (ML) INJECTION AS NEEDED
Status: DISCONTINUED | OUTPATIENT
Start: 2024-01-18 | End: 2024-01-18

## 2024-01-18 RX ORDER — HYDROMORPHONE HCL/PF 1 MG/ML
0.5 SYRINGE (ML) INJECTION
Status: DISCONTINUED | OUTPATIENT
Start: 2024-01-18 | End: 2024-01-18

## 2024-01-18 RX ORDER — HYDROMORPHONE HCL/PF 1 MG/ML
SYRINGE (ML) INJECTION AS NEEDED
Status: DISCONTINUED | OUTPATIENT
Start: 2024-01-18 | End: 2024-01-18

## 2024-01-18 RX ORDER — OXYCODONE HYDROCHLORIDE 5 MG/1
5 TABLET ORAL EVERY 4 HOURS PRN
Status: DISCONTINUED | OUTPATIENT
Start: 2024-01-18 | End: 2024-01-21 | Stop reason: HOSPADM

## 2024-01-18 RX ORDER — SUCCINYLCHOLINE/SOD CL,ISO/PF 100 MG/5ML
SYRINGE (ML) INTRAVENOUS AS NEEDED
Status: DISCONTINUED | OUTPATIENT
Start: 2024-01-18 | End: 2024-01-18

## 2024-01-18 RX ORDER — CHLORHEXIDINE GLUCONATE ORAL RINSE 1.2 MG/ML
15 SOLUTION DENTAL ONCE
Status: COMPLETED | OUTPATIENT
Start: 2024-01-18 | End: 2024-01-18

## 2024-01-18 RX ORDER — ACETAMINOPHEN 325 MG/1
975 TABLET ORAL EVERY 8 HOURS
Status: DISCONTINUED | OUTPATIENT
Start: 2024-01-18 | End: 2024-01-21 | Stop reason: HOSPADM

## 2024-01-18 RX ORDER — MIDAZOLAM HYDROCHLORIDE 2 MG/2ML
INJECTION, SOLUTION INTRAMUSCULAR; INTRAVENOUS AS NEEDED
Status: DISCONTINUED | OUTPATIENT
Start: 2024-01-18 | End: 2024-01-18

## 2024-01-18 RX ORDER — SODIUM CHLORIDE, SODIUM LACTATE, POTASSIUM CHLORIDE, CALCIUM CHLORIDE 600; 310; 30; 20 MG/100ML; MG/100ML; MG/100ML; MG/100ML
125 INJECTION, SOLUTION INTRAVENOUS CONTINUOUS
Status: DISCONTINUED | OUTPATIENT
Start: 2024-01-18 | End: 2024-01-21 | Stop reason: HOSPADM

## 2024-01-18 RX ORDER — ONDANSETRON 2 MG/ML
4 INJECTION INTRAMUSCULAR; INTRAVENOUS ONCE AS NEEDED
Status: DISCONTINUED | OUTPATIENT
Start: 2024-01-18 | End: 2024-01-18

## 2024-01-18 RX ORDER — FENTANYL CITRATE 50 UG/ML
INJECTION, SOLUTION INTRAMUSCULAR; INTRAVENOUS AS NEEDED
Status: DISCONTINUED | OUTPATIENT
Start: 2024-01-18 | End: 2024-01-18

## 2024-01-18 RX ORDER — DOCUSATE SODIUM 100 MG/1
100 CAPSULE, LIQUID FILLED ORAL 2 TIMES DAILY
Status: DISCONTINUED | OUTPATIENT
Start: 2024-01-18 | End: 2024-01-21 | Stop reason: HOSPADM

## 2024-01-18 RX ORDER — CEFAZOLIN SODIUM 2 G/50ML
2000 SOLUTION INTRAVENOUS EVERY 8 HOURS
Status: COMPLETED | OUTPATIENT
Start: 2024-01-19 | End: 2024-01-19

## 2024-01-18 RX ORDER — NEOSTIGMINE METHYLSULFATE 1 MG/ML
INJECTION INTRAVENOUS AS NEEDED
Status: DISCONTINUED | OUTPATIENT
Start: 2024-01-18 | End: 2024-01-18

## 2024-01-18 RX ADMIN — ONDANSETRON 4 MG: 2 INJECTION INTRAMUSCULAR; INTRAVENOUS at 16:16

## 2024-01-18 RX ADMIN — SODIUM CHLORIDE: 0.9 INJECTION, SOLUTION INTRAVENOUS at 13:37

## 2024-01-18 RX ADMIN — PROPOFOL 30 MG: 10 INJECTION, EMULSION INTRAVENOUS at 16:38

## 2024-01-18 RX ADMIN — FENTANYL CITRATE 100 MCG: 50 INJECTION INTRAMUSCULAR; INTRAVENOUS at 11:27

## 2024-01-18 RX ADMIN — FENTANYL CITRATE 25 MCG: 50 INJECTION INTRAMUSCULAR; INTRAVENOUS at 17:19

## 2024-01-18 RX ADMIN — CHLORHEXIDINE GLUCONATE 15 ML: 1.2 SOLUTION ORAL at 09:39

## 2024-01-18 RX ADMIN — Medication 10 MG: at 15:44

## 2024-01-18 RX ADMIN — DEXAMETHASONE SODIUM PHOSPHATE 10 MG: 10 INJECTION, SOLUTION INTRAMUSCULAR; INTRAVENOUS at 12:15

## 2024-01-18 RX ADMIN — METHOCARBAMOL TABLETS 750 MG: 750 TABLET, COATED ORAL at 19:40

## 2024-01-18 RX ADMIN — FENTANYL CITRATE 25 MCG: 50 INJECTION INTRAMUSCULAR; INTRAVENOUS at 17:45

## 2024-01-18 RX ADMIN — PROPOFOL 150 MCG/KG/MIN: 10 INJECTION, EMULSION INTRAVENOUS at 11:31

## 2024-01-18 RX ADMIN — SODIUM CHLORIDE 4 MCG: 9 INJECTION, SOLUTION INTRAVENOUS at 13:43

## 2024-01-18 RX ADMIN — SODIUM CHLORIDE, SODIUM LACTATE, POTASSIUM CHLORIDE, AND CALCIUM CHLORIDE: .6; .31; .03; .02 INJECTION, SOLUTION INTRAVENOUS at 13:21

## 2024-01-18 RX ADMIN — SODIUM CHLORIDE, SODIUM LACTATE, POTASSIUM CHLORIDE, AND CALCIUM CHLORIDE 100 ML/HR: .6; .31; .03; .02 INJECTION, SOLUTION INTRAVENOUS at 09:52

## 2024-01-18 RX ADMIN — FENTANYL CITRATE 25 MCG: 50 INJECTION INTRAMUSCULAR; INTRAVENOUS at 17:42

## 2024-01-18 RX ADMIN — SODIUM CHLORIDE 8 MCG: 9 INJECTION, SOLUTION INTRAVENOUS at 15:44

## 2024-01-18 RX ADMIN — ROCURONIUM BROMIDE 30 MG: 10 INJECTION, SOLUTION INTRAVENOUS at 12:32

## 2024-01-18 RX ADMIN — GLYCOPYRROLATE 0.4 MG: 0.2 INJECTION, SOLUTION INTRAMUSCULAR; INTRAVENOUS at 16:32

## 2024-01-18 RX ADMIN — CEFAZOLIN SODIUM 2000 MG: 2 SOLUTION INTRAVENOUS at 11:42

## 2024-01-18 RX ADMIN — ACETAMINOPHEN 975 MG: 325 TABLET, FILM COATED ORAL at 19:40

## 2024-01-18 RX ADMIN — MIDAZOLAM 2 MG: 1 INJECTION INTRAMUSCULAR; INTRAVENOUS at 11:21

## 2024-01-18 RX ADMIN — CEFAZOLIN SODIUM 2000 MG: 2 SOLUTION INTRAVENOUS at 15:35

## 2024-01-18 RX ADMIN — PROPOFOL 200 MG: 10 INJECTION, EMULSION INTRAVENOUS at 11:27

## 2024-01-18 RX ADMIN — Medication 20 MG: at 12:31

## 2024-01-18 RX ADMIN — FENTANYL CITRATE 25 MCG: 50 INJECTION INTRAMUSCULAR; INTRAVENOUS at 17:34

## 2024-01-18 RX ADMIN — SUGAMMADEX 120 MG: 100 INJECTION, SOLUTION INTRAVENOUS at 16:50

## 2024-01-18 RX ADMIN — HYDROMORPHONE HYDROCHLORIDE 0.5 MG: 1 INJECTION, SOLUTION INTRAMUSCULAR; INTRAVENOUS; SUBCUTANEOUS at 15:53

## 2024-01-18 RX ADMIN — LIDOCAINE HYDROCHLORIDE 100 MG: 20 INJECTION, SOLUTION EPIDURAL; INFILTRATION; INTRACAUDAL at 11:27

## 2024-01-18 RX ADMIN — SODIUM CHLORIDE 8 MCG: 9 INJECTION, SOLUTION INTRAVENOUS at 12:59

## 2024-01-18 RX ADMIN — DOCUSATE SODIUM 100 MG: 100 CAPSULE, LIQUID FILLED ORAL at 19:40

## 2024-01-18 RX ADMIN — TRANEXAMIC ACID 1000 MG: 10 INJECTION, SOLUTION INTRAVENOUS at 11:42

## 2024-01-18 RX ADMIN — SODIUM CHLORIDE 8 MCG: 9 INJECTION, SOLUTION INTRAVENOUS at 16:29

## 2024-01-18 RX ADMIN — PHENYLEPHRINE HYDROCHLORIDE 20 MCG/MIN: 10 INJECTION INTRAVENOUS at 11:32

## 2024-01-18 RX ADMIN — HYDROMORPHONE HYDROCHLORIDE 0.5 MG: 1 INJECTION, SOLUTION INTRAMUSCULAR; INTRAVENOUS; SUBCUTANEOUS at 16:29

## 2024-01-18 RX ADMIN — SODIUM CHLORIDE: 0.9 INJECTION, SOLUTION INTRAVENOUS at 11:36

## 2024-01-18 RX ADMIN — Medication 100 MG: at 11:27

## 2024-01-18 RX ADMIN — SODIUM CHLORIDE 0.2 MCG/KG/MIN: 900 INJECTION INTRAVENOUS at 11:31

## 2024-01-18 RX ADMIN — Medication 10 MG: at 13:40

## 2024-01-18 RX ADMIN — SODIUM CHLORIDE, SODIUM LACTATE, POTASSIUM CHLORIDE, AND CALCIUM CHLORIDE 125 ML/HR: .6; .31; .03; .02 INJECTION, SOLUTION INTRAVENOUS at 19:41

## 2024-01-18 RX ADMIN — NEOSTIGMINE METHYLSULFATE 4 MG: 1 INJECTION INTRAVENOUS at 16:32

## 2024-01-18 NOTE — DISCHARGE INSTR - AVS FIRST PAGE
Dr. Altman Spine Surgery  Post-op Information and Instructions  Lumbar Fusion    1. Follow-up  - You should return to the office for your follow-up appointment approximately 2 weeks post-op. This should have been scheduled prior to your surgery.  If you do not have an appointment scheduled, please call [phone] to do so as soon as possible.  - At the first post op appointment we will check your incision, remove any staples, and make sure that you are healing well.  - X-rays will be taken at this appointment to evaluate your hardware.  - If you need any refills on your pain medications, this will be addressed at your follow-up appointment.  - You can expect to have post-op appointments at 2 weeks, 6 weeks, 3 months, 6 months and 1 year after surgery.  After that time, we will continue to follow you yearly to monitor your fusion.    2. Incision Care:  - You will have a surgical dressing over the area. Maintain this dressing until it starts to fall off. It is placed in a sterile environment and is important to allow the wound to begin healing in a sterile environment.  - Once the initial sterile dressing is removed, keep the incision covered with a non-adhesive dressing. Dressing material can be purchased over the counter at any drug store. Materials include: square gauze, ABD dressing, and skin tape. Place dry dressing over dry incision and tape the sides.  - You may shower on the day that your dressing is removed. Try to keep incision dry for 2 weeks post-op. After that, your incision may get wet in the shower, but DO NOT submerge your incision (bath, hot tub, pool, lake, etc.) until you have been cleared to do so. You may gently wash it with soap and water.  Do not scrub the incision. Air-dry the incision or pat dry with clean, fresh towel before reapplying the dry dressing.  - You have staples closing your incision and also sutures that are buried beneath the skin and will dissolve with time. The staples  will be removed at your follow-up visit, please do not remove them prior to your follow-up appointment. The incision may be raised initially, although this will improve as the sutures dissolve.  - Please do not apply any ointments or salves to the incision unless instructed to do so.  - If you notice any drainage, foul odor, increased redness, swelling or pain of the incision, please call our office immediately.  These may be signs of an infection and should be evaluated.    3. Medications  - You will be given a prescription for pain medication when you are discharged from the hospital.  - It is recommended that you take the medication as prescribed for the first 24-48 hours after surgery, even if your pain is minimal.  It is much easier to control your pain when you stay on top of the medications than if you wait to take them until your pain is severe.  - After the first few days, you should try to take the pain medication less often, and only when needed.  - You may take Tylenol in place of, not in addition to, your pain medication if you wish.  - DO NOT take NSAIDs (Advil, Aleve, Ibuprofen, Motrin, Naproxen, etc.) for 3 months following your surgery.  This may impede the healing of your fusion.  - DO NOT drive while you are taking narcotic pain medications.  - If there is an issue with any of your discharge medications, please call our office at [phone] to discuss.  - It is very common for surgery and narcotic pain medications to cause constipation.  It is very important that you eat a high fiber diet, drink lots of water, and also consider taking a stool softener while taking pain medications to help with this.  It is not uncommon for you to go several days without a bowel movement after surgery.  If you are constipated and it is accompanied by severe abdominal pain, nausea and vomiting, inability to keep food or drink down, and/or a fever, please call our office as these may be signs of a more serious  medical condition.    4. Activity:  - You should have been fitted for a lumbar brace. You may remove this for hygiene (showering, etc.), and for sleeping. The brace should otherwise be worn for the first 3 months post-operatively. If you have any issues with the fit of your brace, you can call the office and we can have you come in to see our brace fitter.  - You should avoid any bending, twisting. If you must bend over, please use your knees.  - You CANNOT lift greater than 5lbs, or a gallon of milk, until instructed.  - You should try to avoid sitting for greater than 20 minutes at a time as this will cause your muscles to stiffen and spasm, making you more uncomfortable.  - You will start PT (physical therapy) around 3 months after surgery.  - You may resume your normal daily activities as tolerated.  - Walking is the best exercise and you should try to walk up to 1 mile throughout the day as you are recovering.  - You should NOT smoke following spinal fusion as this will impede your healing.    5. Diet:  - It is important to eat a well balanced diet to help your body heal.  - You should make sure that you are drinking plenty of fluids - water is best    6. Return to Work:  - You can expect to be out of work for at least 6 weeks up to 3 months.  We will discuss your return to work status at your post-op appointments, but please do not hesitate to call if you have any questions or concerns.  - Please make sure that any short-term disability paperwork is dropped off at the .    Please call our office if you have any of the following after surgery:  - Persistent fever greater than 100.5 degrees  - Foul odor, drainage, redness, swelling or increased pain around your incision site  - A headache that is worse with standing or sitting, and is alleviated with lying flat on your back.  - New onset arm or leg weakness, numbness or tingling  - Significantly increased pain that is not alleviated with pain  medications, rest and ice  - New-onset calf pain    If you experience any chest pain or shortness of breath after you are discharged, call 911 immediately.

## 2024-01-18 NOTE — PROGRESS NOTES
"Progress Note - Orthopedics   Uche Burnett 58 y.o. male MRN: [id number]  Unit/Bed#: [facility] 04      Subjective:    58 y.o.male POD 1 L4-5 Decompression and Fusion. No acute events, no new complaints.     Labs:  0   Lab Value Date/Time    HCT 44.1 12/29/2023 0737    HCT 44.7 07/02/2022 0709    HCT 47.8 09/15/2016 1353    HCT 44.3 08/20/2014 0839    HGB 14.8 12/29/2023 0737    HGB 15.0 07/02/2022 0709    HGB 16.5 09/15/2016 1353    HGB 15.1 08/20/2014 0839    INR 1.00 12/29/2023 0737    WBC 10.04 12/29/2023 0737    WBC 12.24 (H) 07/02/2022 0709    WBC 11.58 (H) 09/15/2016 1353    WBC 12.03 (H) 08/20/2014 0839       Meds:    Current Facility-Administered Medications:     ceFAZolin (ANCEF) IVPB (premix in dextrose) 2,000 mg 50 mL, 2,000 mg, Intravenous, Once, Leanne Lynn PA-C    lactated ringers infusion, 100 mL/hr, Intravenous, Continuous, Jamey Altman MD, Last Rate: 100 mL/hr at 01/18/24 0952, 100 mL/hr at 01/18/24 0952    tranexamic acid (CYKLOKAPRON) 1000-0.7 MG/100ML-% injection 1,000 mg, 1,000 mg, Intravenous, On Call To OR, Leanne Lynn PA-C    Blood Culture:   No results found for: \"BLOODCX\"    Wound Culture:   No results found for: \"WOUNDCULT\"    Ins and Outs:  No intake/output data recorded.          Physical:  Vitals:    01/18/24 0944   BP: 124/80   Pulse: 90   Resp: 18   Temp: 98.6 °F (37 °C)   SpO2: 95%     Musculoskeletal: bilateral Lower Extremity     Skin warm, dry . No erythema or ecchymosis.  Dressing c/d/I, drain in place  TTP courtney-incisionally  Sensation intact L3-S1  2+ DP pulse  Digits warm and well perfused  Capillary refill < 2 seconds    Lower Extremity Motor Function    Right  Left    Iliopsoas  5/5  5/5    Quadriceps 5/5 5/5   Tibialis anterior  5/5  5/5    EHL  5/5  5/5    Gastroc. muscle  5/5  5/5          Assessment:    58 y.o.male POD 1 L4-5 Decompression and Fusion. .     Plan:  WBAT BLE   Lumbar spine precautions  Monitor drain output  Early ambulation  Xrays " Lumbar spine prior to discharge  Will monitor for ABLA and administer IVF/prbc as indicated for Greater than 2 gram drop or Hgb < 7   PT/OT  Pain control  DVT ppx SCDs  Dispo: pending PT and med clearance    Walt Beard MD

## 2024-01-18 NOTE — INTERIM OP NOTE
NAVIGATED L4-5 DECOMPRESSION/LAMINECTOMY WITH INSTRUMENTED FUSION, TLIF  Postoperative Note  PATIENT NAME: Uche Burnett  : 1965  MRN: [id number]  [facility] OR ROOM 18    Surgery Date: 2024    Preop Diagnosis:  Lumbar radiculopathy [M54.16]  Spinal stenosis of lumbar region, unspecified whether neurogenic claudication present [M48.061]    Post-Op Diagnosis Codes:     * Lumbar radiculopathy [M54.16]     * Spinal stenosis of lumbar region, unspecified whether neurogenic claudication present [M48.061]    Procedure(s) (LRB):  NAVIGATED L4-5 DECOMPRESSION/LAMINECTOMY WITH INSTRUMENTED FUSION, TLIF (Bilateral)    Surgeons and Role:     * Jamey Altman MD - Primary     * Walt Beard MD - Assisting     * Leanne Lynn PA-C - Assisting    Specimens:  * No specimens in log *    Estimated Blood Loss:   300 mL    Anesthesia Type:   General     Findings:   Degenerative spondylolisthesis, facet and ligamentum hypertrophy, spinal and foraminal stenosis     Complications:   None      SIGNATURE: Jamey Altman MD   DATE: 2024   TIME: 5:33 PM

## 2024-01-18 NOTE — PROGRESS NOTES
"Progress Note - Orthopedics   Uche Burnett 58 y.o. male MRN: [id number]  Unit/Bed#: [facility] 04      Subjective:    58 y.o.male POD 0 L4-5 Decompression and Fusion. No acute events, no new complaints.     Labs:  0   Lab Value Date/Time    HCT 44.1 12/29/2023 0737    HCT 44.7 07/02/2022 0709    HCT 47.8 09/15/2016 1353    HCT 44.3 08/20/2014 0839    HGB 14.8 12/29/2023 0737    HGB 15.0 07/02/2022 0709    HGB 16.5 09/15/2016 1353    HGB 15.1 08/20/2014 0839    INR 1.00 12/29/2023 0737    WBC 10.04 12/29/2023 0737    WBC 12.24 (H) 07/02/2022 0709    WBC 11.58 (H) 09/15/2016 1353    WBC 12.03 (H) 08/20/2014 0839       Meds:    Current Facility-Administered Medications:     ceFAZolin (ANCEF) IVPB (premix in dextrose) 2,000 mg 50 mL, 2,000 mg, Intravenous, Once, Leanne Lynn PA-C    lactated ringers infusion, 100 mL/hr, Intravenous, Continuous, Jamey Altman MD, Last Rate: 100 mL/hr at 01/18/24 0952, 100 mL/hr at 01/18/24 0952    tranexamic acid (CYKLOKAPRON) 1000-0.7 MG/100ML-% injection 1,000 mg, 1,000 mg, Intravenous, On Call To OR, Leanne Lynn PA-C    Blood Culture:   No results found for: \"BLOODCX\"    Wound Culture:   No results found for: \"WOUNDCULT\"    Ins and Outs:  No intake/output data recorded.          Physical:  Vitals:    01/18/24 0944   BP: 124/80   Pulse: 90   Resp: 18   Temp: 98.6 °F (37 °C)   SpO2: 95%     Musculoskeletal: bilateral Lower Extremity    Skin warm, dry . No erythema or ecchymosis.  Dressing c/d/i  TTP courtney-incisionally  Sensation intact to saphenous, sural, tibial, superficial peroneal nerve, and deep peroneal  Motor intact to +FHL/EHL, +ankle dorsi/plantar flexion  2+ DP pulse  Digits warm and well perfused  Capillary refill < 2 seconds    Assessment:    58 y.o.male POD 0 L4-5 Decompression and Fusion. .     Plan:  WBAT BLE   Lumbar spine precautions  Monitor drain output  Early ambulation  Xrays Lumbar spine prior to discharge  Will monitor for ABLA and administer " IVF/prbc as indicated for Greater than 2 gram drop or Hgb < 7   PT/OT  Pain control  DVT ppx SCDs  Dispo: pending PT and med clearance    Walt Beard MD

## 2024-01-18 NOTE — ANESTHESIA POSTPROCEDURE EVALUATION
Post-Op Assessment Note    CV Status:  Stable    Pain management: adequate       Mental Status:  Sleepy   Hydration Status:  Stable   PONV Controlled:  None   Airway Patency:  Patent     Post Op Vitals Reviewed: Yes      Staff: CRNA               BP   104/63   Temp      Pulse  86   Resp   16   SpO2   99% on 6LFM   Postop VS in PACU noted above, SV non-obstructed with oral airway in place

## 2024-01-18 NOTE — ANESTHESIA PREPROCEDURE EVALUATION
Procedure:  NAVIGATED L4-5 DECOMPRESSION/LAMINECTOMY WITH INSTRUMENTED FUSION, POSSIBLE TLIF (Bilateral: Spine Thoracic)    Relevant Problems   NEURO/PSYCH   (+) Chronic pain    Hx ETOH abuse stopped 6 yrs ago, quit smoking 2 months ago    Physical Exam    Airway    Mallampati score: I  TM Distance: >3 FB  Neck ROM: full     Dental   Comment: Poor dentition     Cardiovascular  Cardiovascular exam normal    Pulmonary  Pulmonary exam normal     Other Findings        Anesthesia Plan  ASA Score- 2     Anesthesia Type- general with ASA Monitors.         Additional Monitors:     Airway Plan: ETT.           Plan Factors-Exercise tolerance (METS): >4 METS.    Chart reviewed. EKG reviewed. Imaging results reviewed. Existing labs reviewed. Patient summary reviewed.    Patient is not a current smoker.              Induction- intravenous.    Postoperative Plan- Plan for postoperative opioid use. Planned trial extubation    Informed Consent- Anesthetic plan and risks discussed with patient.  I personally reviewed this patient with the CRNA. Discussed and agreed on the Anesthesia Plan with the CRNA..

## 2024-01-19 ENCOUNTER — APPOINTMENT (OUTPATIENT)
Dept: RADIOLOGY | Facility: HOSPITAL | Age: 59
End: 2024-01-19
Payer: COMMERCIAL

## 2024-01-19 LAB
ANION GAP SERPL CALCULATED.3IONS-SCNC: 10 MMOL/L
BUN SERPL-MCNC: 17 MG/DL (ref 5–25)
CALCIUM SERPL-MCNC: 9 MG/DL (ref 8.4–10.2)
CHLORIDE SERPL-SCNC: 104 MMOL/L (ref 96–108)
CO2 SERPL-SCNC: 25 MMOL/L (ref 21–32)
CREAT SERPL-MCNC: 0.78 MG/DL (ref 0.6–1.3)
ERYTHROCYTE [DISTWIDTH] IN BLOOD BY AUTOMATED COUNT: 13.6 % (ref 11.6–15.1)
GFR SERPL CREATININE-BSD FRML MDRD: 100 ML/MIN/1.73SQ M
GLUCOSE SERPL-MCNC: 141 MG/DL (ref 65–140)
HCT VFR BLD AUTO: 37.3 % (ref 36.5–49.3)
HGB BLD-MCNC: 12.8 G/DL (ref 12–17)
MCH RBC QN AUTO: 29.6 PG (ref 26.8–34.3)
MCHC RBC AUTO-ENTMCNC: 34.3 G/DL (ref 31.4–37.4)
MCV RBC AUTO: 86 FL (ref 82–98)
PLATELET # BLD AUTO: 301 THOUSANDS/UL (ref 149–390)
PMV BLD AUTO: 9.6 FL (ref 8.9–12.7)
POTASSIUM SERPL-SCNC: 4.1 MMOL/L (ref 3.5–5.3)
RBC # BLD AUTO: 4.33 MILLION/UL (ref 3.88–5.62)
SODIUM SERPL-SCNC: 139 MMOL/L (ref 135–147)
WBC # BLD AUTO: 24.22 THOUSAND/UL (ref 4.31–10.16)

## 2024-01-19 PROCEDURE — 97166 OT EVAL MOD COMPLEX 45 MIN: CPT

## 2024-01-19 PROCEDURE — 97162 PT EVAL MOD COMPLEX 30 MIN: CPT

## 2024-01-19 PROCEDURE — NC001 PR NO CHARGE: Performed by: ORTHOPAEDIC SURGERY

## 2024-01-19 PROCEDURE — 99254 IP/OBS CNSLTJ NEW/EST MOD 60: CPT | Performed by: INTERNAL MEDICINE

## 2024-01-19 PROCEDURE — 99024 POSTOP FOLLOW-UP VISIT: CPT | Performed by: ORTHOPAEDIC SURGERY

## 2024-01-19 PROCEDURE — 72100 X-RAY EXAM L-S SPINE 2/3 VWS: CPT

## 2024-01-19 PROCEDURE — 80048 BASIC METABOLIC PNL TOTAL CA: CPT

## 2024-01-19 PROCEDURE — 85027 COMPLETE CBC AUTOMATED: CPT

## 2024-01-19 RX ADMIN — SODIUM CHLORIDE, SODIUM LACTATE, POTASSIUM CHLORIDE, AND CALCIUM CHLORIDE 125 ML/HR: .6; .31; .03; .02 INJECTION, SOLUTION INTRAVENOUS at 03:52

## 2024-01-19 RX ADMIN — METHOCARBAMOL TABLETS 750 MG: 750 TABLET, COATED ORAL at 00:01

## 2024-01-19 RX ADMIN — METHOCARBAMOL TABLETS 750 MG: 750 TABLET, COATED ORAL at 16:56

## 2024-01-19 RX ADMIN — ACETAMINOPHEN 975 MG: 325 TABLET, FILM COATED ORAL at 00:01

## 2024-01-19 RX ADMIN — METHOCARBAMOL TABLETS 750 MG: 750 TABLET, COATED ORAL at 12:03

## 2024-01-19 RX ADMIN — ACETAMINOPHEN 975 MG: 325 TABLET, FILM COATED ORAL at 16:56

## 2024-01-19 RX ADMIN — CEFAZOLIN SODIUM 2000 MG: 2 SOLUTION INTRAVENOUS at 00:02

## 2024-01-19 RX ADMIN — METHOCARBAMOL TABLETS 750 MG: 750 TABLET, COATED ORAL at 05:00

## 2024-01-19 RX ADMIN — ACETAMINOPHEN 975 MG: 325 TABLET, FILM COATED ORAL at 08:15

## 2024-01-19 RX ADMIN — CEFAZOLIN SODIUM 2000 MG: 2 SOLUTION INTRAVENOUS at 08:15

## 2024-01-19 NOTE — PLAN OF CARE
Problem: PAIN - ADULT  Goal: Verbalizes/displays adequate comfort level or baseline comfort level  Description: Interventions:  - Encourage patient to monitor pain and request assistance  - Assess pain using appropriate pain scale  - Administer analgesics based on type and severity of pain and evaluate response  - Implement non-pharmacological measures as appropriate and evaluate response  - Consider cultural and social influences on pain and pain management  - Notify physician/advanced practitioner if interventions unsuccessful or patient reports new pain  Outcome: Progressing     Problem: INFECTION - ADULT  Goal: Absence or prevention of progression during hospitalization  Description: INTERVENTIONS:  - Assess and monitor for signs and symptoms of infection  - Monitor lab/diagnostic results  - Monitor all insertion sites, i.e. indwelling lines, tubes, and drains  - Monitor endotracheal if appropriate and nasal secretions for changes in amount and color  - Donnelly appropriate cooling/warming therapies per order  - Administer medications as ordered  - Instruct and encourage patient and family to use good hand hygiene technique  - Identify and instruct in appropriate isolation precautions for identified infection/condition  Outcome: Progressing     Problem: DISCHARGE PLANNING  Goal: Discharge to home or other facility with appropriate resources  Description: INTERVENTIONS:  - Identify barriers to discharge w/patient and caregiver  - Arrange for needed discharge resources and transportation as appropriate  - Identify discharge learning needs (meds, wound care, etc.)  - Arrange for interpretive services to assist at discharge as needed  - Refer to Case Management Department for coordinating discharge planning if the patient needs post-hospital services based on physician/advanced practitioner order or complex needs related to functional status, cognitive ability, or social support system  Outcome: Progressing      Problem: Knowledge Deficit  Goal: Patient/family/caregiver demonstrates understanding of disease process, treatment plan, medications, and discharge instructions  Description: Complete learning assessment and assess knowledge base.  Interventions:  - Provide teaching at level of understanding  - Provide teaching via preferred learning methods  Outcome: Progressing     Problem: MUSCULOSKELETAL - ADULT  Goal: Maintain or return mobility to safest level of function  Description: INTERVENTIONS:  - Assess patient's ability to carry out ADLs; assess patient's baseline for ADL function and identify physical deficits which impact ability to perform ADLs (bathing, care of mouth/teeth, toileting, grooming, dressing, etc.)  - Assess/evaluate cause of self-care deficits   - Assess range of motion  - Assess patient's mobility  - Assess patient's need for assistive devices and provide as appropriate  - Encourage maximum independence but intervene and supervise when necessary  - Involve family in performance of ADLs  - Assess for home care needs following discharge   - Consider OT consult to assist with ADL evaluation and planning for discharge  - Provide patient education as appropriate  Outcome: Progressing  Goal: Maintain proper alignment of affected body part  Description: INTERVENTIONS:  - Support, maintain and protect limb and body alignment  - Provide patient/ family with appropriate education  Outcome: Progressing

## 2024-01-19 NOTE — CONSULTS
[facility]  Consult  Name: Uche Burnett 58 y.o. male I MRN: [id number]  Unit/Bed#: -Trini I Date of Admission: 1/18/2024   Date of Service: 1/19/2024 I Hospital Day: 0    Inpatient consult to Internal Medicine  Consult performed by: Treva Franco PA-C  Consult ordered by: Leanne Lynn PA-C          Assessment/Plan   * Spinal stenosis of lumbar region  Assessment & Plan  S/p navigated L4-5 decompression/laminectomy with instrumented fusion, TLIF (Bilateral)   Pain controlled  Continue encourage incentive spirometry; monitor fever curve  DVT prophylaxis in place and reviewed           PRE-OP HGB LEVEL: 14.8    Pt is medically stable for DC.    Subjective/ HPI: Uche Burnett was seen and examined. Hx of Lumbarpain failed out patient conservative measures. Elected to undergo surgical intervention. We are asked to see patient for post op management of underlying medical co-morbidities as outlined above. Pt did well intra and post operatively with good hemodynamics. Pt currently comfortable and without any reported post op nausea.     ROS:   A 10 point ROS was performed; negative except as noted above.     Social History:    Substance Use History:   Social History     Substance and Sexual Activity   Alcohol Use Not Currently    Comment: quit 25 years ago     Social History     Tobacco Use   Smoking Status Former    Types: Cigarettes   Smokeless Tobacco Not on file   Tobacco Comments    Quit smoking 2 months ago     Social History     Substance and Sexual Activity   Drug Use No       Family History:    History reviewed. No pertinent family history.      Review of Scheduled Meds:  Current Facility-Administered Medications   Medication Dose Route Frequency Provider Last Rate    acetaminophen  975 mg Oral Q8H Leanne Lynn PA-C      aluminum-magnesium hydroxide-simethicone  30 mL Oral Q6H PRN Leanne Lynn PA-C      calcium carbonate  1,000 mg  "Oral Daily PRN Leanne Lynn PA-C      cefazolin  2,000 mg Intravenous Q8H Leanne Lynn PA-C Stopped (24 0035)    docusate sodium  100 mg Oral BID Leanne Lynn PA-C      lactated ringers  100 mL/hr Intravenous Continuous Leanne Lynn PA-C 100 mL/hr (24 0952)    lactated ringers  125 mL/hr Intravenous Continuous Leanne Lynn PA-C 125 mL/hr (24 0352)    methocarbamol  750 mg Oral Q6H INDRA Leanne Lynn PA-C      ondansetron  4 mg Intravenous Q6H PRN Leanne Lynn PA-C      oxyCODONE  10 mg Oral Q4H PRN Leanne Lynn PA-C      oxyCODONE  5 mg Oral Q4H PRN Leanne Lynn PA-C      senna  1 tablet Oral Daily Leanne Lynn PA-C         Labs:         Results from last 7 days   Lab Units 24  0431   SODIUM mmol/L 139   POTASSIUM mmol/L 4.1   CHLORIDE mmol/L 104   CO2 mmol/L 25   BUN mg/dL 17   CREATININE mg/dL 0.78   CALCIUM mg/dL 9.0                      No results found for: \"BLOODCX\", \"URINECX\", \"WOUNDCULT\", \"SPUTUMCULTUR\"    Input and Output Summary (last 24 hours):       Intake/Output Summary (Last 24 hours) at 2024 0808  Last data filed at 2024 0712  Gross per 24 hour   Intake 4370 ml   Output 1310 ml   Net 3060 ml       Imaging:     XR spine lumbar 2 or 3 views injury    (Results Pending)       *Labs /Radiology studiesLabs reviewed  *Medications reviewed and reconciled as needed  *Please refer to order section for additional ordered labs studies  *Case discussed with primary attending during morning huddle case rounds    Vitals:   Temp (24hrs), Av.2 °F (36.8 °C), Min:97.4 °F (36.3 °C), Max:98.8 °F (37.1 °C)    Temp:  [97.4 °F (36.3 °C)-98.8 °F (37.1 °C)] 98.3 °F (36.8 °C)  HR:  [] 96  Resp:  [14-20] 17  BP: ()/(63-83) 116/78  SpO2:  [92 %-99 %] 95 %  Body mass index is 19.2 kg/m².     Physical Exam:   General Appearance: no distress, conversive, Lumbar brace in " place  HEENT: PERRLA, conjuctiva normal; oropharynx clear; mucous membranes moist;   Neck:  Supple, no lymphadenopathy or thyromegaly  Lungs: clear bilaterally, normal respiratory effort, no retractions, expiratory effort normal  CV: S1 S2 regular, no murmurs rubs or galops   ABD: soft non tender, +BS x4  EXT: DP pulses intact, no lymphadenopathy, no edema   Skin: normal turgor, normal texture, no rash  Psych: affect normal, mood normal  Neuro: AAOx3          Invasive Devices       Peripheral Intravenous Line  Duration             Peripheral IV 01/18/24 Right Hand <1 day    Peripheral IV 01/18/24 Right;Lower Forearm <1 day              Drain  Duration             Closed/Suction Drain Left Back Bulb 19 Fr. <1 day              Airway  Duration             Non-Surgical Airway Oral pharyngeal airway 90 mm <1 day                       Code Status: No Order  Current Length of Stay: 0 day(s)    Total floor / unit time spent today 45 minutes  Coordination of patient's care was performed in conjunction with primary service. Time invested included review of patient's labs, vitals, and management of their comorbidities with continued monitoring, examination of patient as well as answering patient questions, documenting her findings and creating progress note in electronic medical record,  ordering appropriate diagnostic testing. In addition, this patient was discussed with medical team including physician and advanced extenders.           ** Please Note: Fluency Direct voice to text software may have been used in the creation of this document. Audio transcription errors may occur**

## 2024-01-19 NOTE — OP NOTE
OPERATIVE REPORT  PATIENT NAME: Uche Burnett    :  1965  MRN: [id number]  Pt Location: [facility] OR ROOM 18    SURGERY DATE: 2024    Surgeons and Role:     * Jamey Altman MD - Primary     * Walt Beard MD - Assisting     * Leanne Lynn PA-C - Assisting    Preop Diagnosis:  Lumbar radiculopathy [M54.16]  Spinal stenosis of lumbar region, unspecified whether neurogenic claudication present [M48.061]    Post-Op Diagnosis Codes:     * Lumbar radiculopathy [M54.16]     * Spinal stenosis of lumbar region, unspecified whether neurogenic claudication present [M48.061]    Procedure(s):  Bilateral - NAVIGATED L4-5 DECOMPRESSION/LAMINECTOMY WITH INSTRUMENTED FUSION. TLIF    Specimen(s):  * No specimens in log *    Estimated Blood Loss:   300 mL    Drains:  Closed/Suction Drain Left Back Bulb 19 Fr. (Active)   Site Description Unable to view 24 0000   Dressing Status Clean;Dry;Intact 24 0000   Drainage Appearance Bloody 24 0000   Status To bulb suction 24 0000   Output (mL) 20 mL 24 0712   Number of days: 1       [REMOVED] Urethral Catheter Latex 16 Fr. (Removed)   Output (mL) 100 mL 24 1646   Number of days: 0       Anesthesia Type:   General    Operative Indications:  Lumbar radiculopathy [M54.16]  Spinal stenosis of lumbar region, unspecified whether neurogenic claudication present [M48.061]    OPERATIVE REPORT  PATIENT NAME: Klaudia Richardson    :  11/15/1940  MRN: [id number]  Pt Location: [facility] OR ROOM 18     SURGERY DATE: 2024     Surgeons and Role:     * Jamey Altman MD - Primary     * Walt Beard MD - Assisting     * Leanne Lynn PA-C - Assisting     Preop Diagnosis:  Spinal stenosis of lumbar region with neurogenic claudication [M48.062]  Radiculopathy, lumbar region [M54.16]  Degenerative spondylolisthesis [M43.10]     Post-Op Diagnosis Codes:     * Spinal stenosis of lumbar region with neurogenic claudication  [M48.062]     * Radiculopathy, lumbar region [M54.16]     * Degenerative spondylolisthesis [M43.10]     Procedure(s):  Bilateral - FUSION LUMBAR/THORACIC POSTERIOR. L4-5 laminectomy/decompression with instrumented fusion with navigation     Specimen(s):  * No specimens in log *     Estimated Blood Loss:   150 mL     Drains:       Closed/Suction Drain Inferior;Left Back Bulb 19 Fr. (Active)   Site Description Unable to view 01/11/24 1817   Dressing Status Clean;Dry;Intact 01/11/24 1817   Drainage Appearance Bloody;Bright red 01/11/24 1817   Status To bulb suction 01/11/24 1817   Number of days: 0         Anesthesia Type:   General     Operative Indications:  Spinal stenosis of lumbar region with neurogenic claudication [M48.062]  Radiculopathy, lumbar region [M54.16]  Degenerative spondylolisthesis [M43.10]     58-year-old male with back pain, bilateral leg pain, ambulatory dysfunction in the setting of L4-5 degenerative spondylolisthesis with L4-5 spinal stenosis. After failing conservative management, the risks, benefits and alternatives to surgery were discussed and patient elected to proceed with surgical intervention.     Plan for posterior lumbar decompression/laminectomy and posterior lateral lumbar fusion with instrumentation and TLIF.  Consent previously obtained in outpatient setting.   Prior to surgery in the holding area, I again explained in detail to the patient and the patient's wife the possible risks of surgery which include the risk of nerve injury, persistent, and/or worsening pain, spinal fluid leak, infection, and/or meningitis, excessive bleeding, paralysis, death, blindness, blood vessel injury, need for further surgery, instability, as well as the potential for unforeseen medical and surgical complications.  We also discussed risk of junctional degeneration, bone graft complications, the differences in efficacy between local bone graft, autologous iliac crest bone graft and allograft, the FDA  status of the instrumentation and products, the risk of nonhealing and instrumentation failure, as well as chronic pain.  I reiterated an understanding, that in general, spine surgery is more predictive of improving extremity discomfort rather than axial spine pain and arresting the progression of spinal cord dysfunction rather than improving it.  Uche Burnett had no further questions.      Operative Findings:  Degenerative spondylolisthesis, facet and ligamentum hypertrophy, spinal stenosis     Complications:   None    Procedure and Technique:  Patient was identified in the preoperative holding area.  The operative site was appropriately marked.      Patient was taken to the operating room.  Antibiotics were administered.  Sequential compression devices were applied.  After completion anesthesia, neuro monitoring leads were applied and valdovinos was inserted.  Patient was placed prone on the Edilson table.  During this time and the entire operation, care was taken to maintain appropriate perfusion pressures.  All bony prominences were properly padded.  Preprocedure fluoroscopic images were obtained in AP and lateral position.  The operative field was then prepped and draped in the normal sterile fashion.  Surgical loupes and headlamp were used during the procedure.      Incision was made in the skin over the intended surgical levels and dissection was carried down through the subcutaneous tissue down to level of deep fascia.   Hemostasis was obtained using Bovie cautery.  The deep fascia was elevated off the posterior elements of L4-L5 in a subperiosteal manner. The lateral border of the pars was identified at appropriate levels.  Dissection was carried out lateral on the patient's left side to identify the L4 and L5 transverse processes.  The inner transverse membrane was preserved.      O-Arm CT navigation was utilized to confirm appropriate surgical levels and for L4 and L5 pedicle screws.  First using a navigated  bur for a starting hole, followed by navigated drill, the pedicle was drilled.  A ball-tip probe was used to palpate the pedicle and bone was encountered at the base of the tract as well as along all 4 walls.  The pedicle was tapped using a navigated tap.  6.5x40mm L4 screws and 7.5x40mm L5 screws were then placed under navigated guidance. Neurophysiology monitoring was stable.       We turned our attention to the decompression. The L4 spinous processes was removed with a rongeur.  The bone was then handed off to the back table for preparation as autograft.  The L4 lamina were thinned bilaterally with the use of a rongeur and high speed johnathan.  A small curved curette was then used to undermine the inferior insertion the ligamentum flavum on the undersurface of the inferior lamina.  All dural adhesions were freed from the surrounding bone and ligaments with the use of a Penfield 3 and [place] prior to the use of a Kerrison punch.      Using a combination of rongeurs and high speed johnathan and 3-0 Kerrison was used to perform a midline decompression.   At L4 a residual laminar bridge was preserved.  We then turned our attention to the foraminal decompression and TLIF at the L4-5 segment. Right sided lateral recess decompression and medial facetectomies were performed with 3-0 and 4-0 Kerrison punch.  An osteotome was then used to create an osteotomy through the right sided inferior laminar edge of the cephalad vertebrae exiting at the pars interarticularis at the same level, taking care not to osteotomize the pedicle above.  This allowed removal of the inferior articular process of the cephalad vertebrae.  The superior articular process of the inferior vertebrae was then removed with a large rongeur and the respective pedicle was skeletonized using a 3 and 4 Kerrison punch.  After meticulous hemostasis was achieved with a bipolar cautery, the exiting and traversing nerve root of the motion segment were then well exposed.   Wide exposure of the transforaminal area was now obtained and sharp annulotomy and radical discectomy of the disc base was performed using scalpel, pituitaries, straight and angled curettes.  Distraction was carried out intermittently with use of bullet dilators placed into the interspace following the discectomy and pedicle screw distraction.  After a thorough discectomy was obtained from the ipsilateral to contralateral side, the endplates were feathered with angled osteotomes.  The interspace was then sized for an intervertebral prosthetic cage device.  The interspace was trialed to the appropriate size implant which was then placed after being filled with local autograft bone as well as I-factor bone graft, and impacted into the interspace carefully protecting both the traversing and exiting nerve roots.  Bone graft was placed in front of the implant prior to placement.  Neurophysiology monitoring was stable.  The device had an excellent fit within the interspace and was found to be in acceptable position on spine imaging.  A Yuniel was then passed around the exiting nerve to confirm that it was free without any compression.  In addition, a Penfield 4 was used to confirm that the exiting root could be freely mobilized from the medial border of the pedicles.  Neurophysiology monitoring was stable. A [id number] Medtronic Adaptix was used.      A valsalva was performed.  No dural rents, leaks were attenuations were noted.  No residual sharp bony edges remained.  The wound was extensively irrigated.  Hemostatic agents were applied and there was no active bleeding noted.     The left sided L4 and L5 transverse processes, lateral pars and the lateral wall the facet joint were decorticated with the use of a high-speed johnathan.  Local autograft and I-factor bone graft and cancellous allograft were mixed and packed in the left inner transverse space at L4-L5.         O-Arm CT was used to confirm appropriate placement of the  screws and interbody cages.   The screws were all stimulated and found to be within appropriate thresholds. Appropriate sized lordotic rods were inserted in the tulip heads and secured.  The set screws were final tightened.  Final fluoroscopic images were obtained.  Orthogonal views confirmed appropriate placement of instrumentation.       19Fr Bill deep drain was placed in the wound bed. Vancomycin powder was placed into the wound.  Wound was closed in a layered fashion including muscle and fascia with 1 Vicryl followed by 2-0 Vicryl and staples for skin.  The drain was secured.  Mepilex dressing was applied to the wound.  The sponge and needle count were reported as correct at the end of the case.       The patient was gently flipped supine, emerged from anesthesia, and extubated.  Patient was transferred to the recovery room in stable condition.       I was present for the entire procedure     Patient Disposition:  PACU       SIGNATURE: Jamey Altman MD  DATE: January 19, 2024  TIME: 7:58 AM

## 2024-01-19 NOTE — RESTORATIVE TECHNICIAN NOTE
"       Restorative Technician Note      Patient Name: Uche Burnett     Restorative Tech Visit Date: 01/19/24  Note Type: Bracing, Initial consult  Patient Position Upon Consult: Standing  Brace Applied: [place] LSO  Patient Position When Brace Applied: Standing  Education Provided: Yes  Patient Position at End of Consult: Standing  Nurse Communication: Nurse aware of consult, application of brace    Please contact Mobility Coordinator on Tiger text \"SLB-PT-Restorative Tech\" role in regards to bracing instruction and/or adjustment.    KAREN Lo            "

## 2024-01-19 NOTE — PHYSICAL THERAPY NOTE
PHYSICAL THERAPY EVALUATION  NAME:  Uche Burnett  DATE: 01/19/24    AGE:   58 y.o.  Mrn:   [id number]  ADMIT DX:  Lumbar radiculopathy [M54.16]  Spinal stenosis of lumbar region, unspecified whether neurogenic claudication present [M48.061]    Past Medical History:   Diagnosis Date    Arthritis        Past Surgical History:   Procedure Laterality Date    LUMBAR FUSION Bilateral 1/18/2024    Procedure: NAVIGATED L4-5 DECOMPRESSION/LAMINECTOMY WITH INSTRUMENTED FUSION, TLIF;  Surgeon: Jamey Altman MD;  Location: [facility];  Service: Orthopedics    ROTATOR CUFF REPAIR         Length Of Stay: 0    PHYSICAL THERAPY EVALUATION:        01/19/24 0900   Note Type   Note type Evaluation   Pain Assessment   Pain Assessment Tool 0-10   Pain Score No Pain   Restrictions/Precautions   Weight Bearing Precautions Per Order Yes   RLE Weight Bearing Per Order WBAT   LLE Weight Bearing Per Order WBAT   Braces or Orthoses LSO   Other Precautions Pain;Spinal precautions   Home Living   Type of Home House   Home Layout Multi-level;Bed/bath upstairs;Stairs to enter with rails   Home Equipment   (none as per pt)   Additional Comments Pt reports living with spouse and children who are able to assist as needed   Prior Function   Level of [place] Independent with functional mobility   Lives With Spouse;Family   Receives Help From Family   Falls in the last 6 months 0   Comments Pt denies the use of an AD for ambulation PTA   General   Family/Caregiver Present No   Cognition   Overall Cognitive Status WFL   RUE Assessment   RUE Assessment WFL   LUE Assessment   LUE Assessment WFL   RLE Assessment   RLE Assessment WFL   LLE Assessment   LLE Assessment WFL   Bed Mobility   Additional Comments NA, Pt OOB at time of PT eval   Transfers   Sit to Stand 6  Modified independent   Stand to Sit 6  Modified independent   Ambulation/Elevation   Gait pattern WNL   Gait Assistance 6  Modified independent   Assistive Device None   Distance  85ft x 2   Balance   Static Sitting Normal   Static Standing Normal   Ambulatory Good   Activity Tolerance   Activity Tolerance Patient tolerated treatment well   Medical Staff Made Aware Chise, OT; OT present for co evaluation due to pts current medical presentation   Nurse Made Aware Pt appropriate to be seen and mobilize per nsg   Assessment   Prognosis Good   Problem List Decreased mobility;Orthopedic restrictions   Assessment Pt is 58 y.o. male seen for PT evaluation [facility] on 1/18/2024. Two pt identifiers were used to confirm. Pt presented for scheduled L4-5 Decompression and Fusion which was performed on 1/18/24.  Pt with a primary dx of: spinal stenosis of lumbar region s/p L4-5 Decompression and Fusion .  PT now consulted for assessment of mobility and d/c needs. Pt with  OOB with brace  orders.  Pts current co morbidities affecting treatment include:  has a past medical history of Arthritis. . Pts current clinical presentation is Evolving (medium complexity) due to Ongoing medical management for primary dx, Decreased activity tolerance compared to baseline, Spinal precautions at current time, s/p surgical intervention. Upon evaluation, pt currently is requiring ; Mod I for transfers and Mod I for ambulation w/ no AD. Pt presents at PT eval functioning near baseline and currently w/ overall mobility deficits 2* to: decreased activity tolerance compared to baseline, spinal precautions.  At conclusion of PT session pt left with phone and call bell within reach. Pt denies any further questions at this time. PT is currently recommending Home with family support and Outpatient PT when appropriate.D/C acute care PT at this time due to pt being supervision with mod I with all mobility and having supportive family who are able to assist if needed. Pt denies any mobility or safety concerns about returning home at d/c. Recommend pt continues to mobilize with nsg and restorative techs during hospital  "stay.   Barriers to Discharge None   Goals   Patient Goals \" to go home\"   Plan   PT Frequency   (DC IPPT)   Discharge Recommendation   Rehab Resource Intensity Level, PT III (Minimum Resource Intensity)   Equipment Recommended   (none at this time)   AM-PAC Basic Mobility Inpatient   Turning in Flat Bed Without Bedrails 4   Lying on Back to Sitting on Edge of Flat Bed Without Bedrails 4   Moving Bed to Chair 4   Standing Up From Chair Using Arms 4   Walk in Room 4   Climb 3-5 Stairs With Railing 4   Basic Mobility Inpatient Raw Score 24   Basic Mobility Standardized Score 57.68   Highest Level Of Mobility   -HLM Goal 8: Walk 250 feet or more   JH-HLM Achieved 7: Walk 25 feet or more   Modified [place] Scale   Modified [place] Scale 1   Barthel Index   Feeding 10   Bathing 5   Grooming Score 5   Dressing Score 10   Bladder Score 10   Bowels Score 10   Toilet Use Score 10   Transfers (Bed/Chair) Score 15   Mobility (Level Surface) Score 15   Stairs Score 10   Barthel Index Score 100   Portions of the documentation may have been created using voice recognition software.Occasional wrong word or sound alike substitutions may have occurred due to the inherent limitations of the voice recognition software. Read the chart carefully and recognize, using context, where substitutions have occurred.    Duncan Kirkpatrick, PT, DPT      "

## 2024-01-19 NOTE — PLAN OF CARE
Problem: PAIN - ADULT  Goal: Verbalizes/displays adequate comfort level or baseline comfort level  Description: Interventions:  - Encourage patient to monitor pain and request assistance  - Assess pain using appropriate pain scale  - Administer analgesics based on type and severity of pain and evaluate response  - Implement non-pharmacological measures as appropriate and evaluate response  - Consider cultural and social influences on pain and pain management  - Notify physician/advanced practitioner if interventions unsuccessful or patient reports new pain  Outcome: Progressing     Problem: INFECTION - ADULT  Goal: Absence or prevention of progression during hospitalization  Description: INTERVENTIONS:  - Assess and monitor for signs and symptoms of infection  - Monitor lab/diagnostic results  - Monitor all insertion sites, i.e. indwelling lines, tubes, and drains  - Monitor endotracheal if appropriate and nasal secretions for changes in amount and color  - [place] appropriate cooling/warming therapies per order  - Administer medications as ordered  - Instruct and encourage patient and family to use good hand hygiene technique  - Identify and instruct in appropriate isolation precautions for identified infection/condition  Outcome: Progressing     Problem: DISCHARGE PLANNING  Goal: Discharge to home or other facility with appropriate resources  Description: INTERVENTIONS:  - Identify barriers to discharge w/patient and caregiver  - Arrange for needed discharge resources and transportation as appropriate  - Identify discharge learning needs (meds, wound care, etc.)  - Arrange for interpretive services to assist at discharge as needed  - Refer to Case Management Department for coordinating discharge planning if the patient needs post-hospital services based on physician/advanced practitioner order or complex needs related to functional status, cognitive ability, or social support system  Outcome: Progressing      Problem: Knowledge Deficit  Goal: Patient/family/caregiver demonstrates understanding of disease process, treatment plan, medications, and discharge instructions  Description: Complete learning assessment and assess knowledge base.  Interventions:  - Provide teaching at level of understanding  - Provide teaching via preferred learning methods  Outcome: Progressing     Problem: MUSCULOSKELETAL - ADULT  Goal: Maintain or return mobility to safest level of function  Description: INTERVENTIONS:  - Assess patient's ability to carry out ADLs; assess patient's baseline for ADL function and identify physical deficits which impact ability to perform ADLs (bathing, care of mouth/teeth, toileting, grooming, dressing, etc.)  - Assess/evaluate cause of self-care deficits   - Assess range of motion  - Assess patient's mobility  - Assess patient's need for assistive devices and provide as appropriate  - Encourage maximum independence but intervene and supervise when necessary  - Involve family in performance of ADLs  - Assess for home care needs following discharge   - Consider OT consult to assist with ADL evaluation and planning for discharge  - Provide patient education as appropriate  Outcome: Progressing  Goal: Maintain proper alignment of affected body part  Description: INTERVENTIONS:  - Support, maintain and protect limb and body alignment  - Provide patient/ family with appropriate education  Outcome: Progressing

## 2024-01-19 NOTE — PROGRESS NOTES
Progress Note - Orthopedics   Uche Burnett 58 y.o. male MRN: [id number]  Unit/Bed#: -01      Subjective:    58 y.o.male POD 2 L4-5 Decompression and fusion. No acute events, no new complaints. Pain well controlled. Denies fevers, chills, CP, SOB, N/V, numbness or tingling. Patient reports no issues with urination or bowel movements. Patient states he is doing well and ready to go home.    Labs:  0   Lab Value Date/Time    HCT 37.3 01/19/2024 1607    HCT 44.1 12/29/2023 0737    HCT 44.7 07/02/2022 0709    HCT 44.3 08/20/2014 0839    HGB 12.8 01/19/2024 1607    HGB 14.8 12/29/2023 0737    HGB 15.0 07/02/2022 0709    HGB 15.1 08/20/2014 0839    INR 1.00 12/29/2023 0737    WBC 24.22 (H) 01/19/2024 1607    WBC 10.04 12/29/2023 0737    WBC 12.24 (H) 07/02/2022 0709    WBC 12.03 (H) 08/20/2014 0839       Meds:    Current Facility-Administered Medications:     acetaminophen (TYLENOL) tablet 975 mg, 975 mg, Oral, Q8H, Leanne Lynn PA-C, 975 mg at 01/19/24 1656    aluminum-magnesium hydroxide-simethicone (MAALOX) oral suspension 30 mL, 30 mL, Oral, Q6H PRN, Leanne Lynn PA-C    calcium carbonate (TUMS) chewable tablet 1,000 mg, 1,000 mg, Oral, Daily PRN, Leanne Lynn PA-C    docusate sodium (COLACE) capsule 100 mg, 100 mg, Oral, BID, Leanne Lynn PA-C, 100 mg at 01/18/24 1940    lactated ringers infusion, 100 mL/hr, Intravenous, Continuous, Leanne Lynn PA-C, Last Rate: 100 mL/hr at 01/18/24 0952, New Bag at 01/18/24 1321    lactated ringers infusion, 125 mL/hr, Intravenous, Continuous, Leanne Lynn PA-C, Last Rate: 125 mL/hr at 01/19/24 0352, 125 mL/hr at 01/19/24 0352    methocarbamol (ROBAXIN) tablet 750 mg, 750 mg, Oral, Q6H INDRA, Leanne Lynn PA-C, 750 mg at 01/19/24 1656    ondansetron (ZOFRAN) injection 4 mg, 4 mg, Intravenous, Q6H PRN, Leanne Lynn PA-C    oxyCODONE (ROXICODONE) IR tablet 10 mg, 10 mg, Oral, Q4H PRN, Leanne  "Lisa Lynn PA-C    oxyCODONE (ROXICODONE) IR tablet 5 mg, 5 mg, Oral, Q4H PRN, Leanne Lynn PA-C    senna (SENOKOT) tablet 8.6 mg, 1 tablet, Oral, Daily, Leanne Lynn PA-C    Blood Culture:   No results found for: \"BLOODCX\"    Wound Culture:   No results found for: \"WOUNDCULT\"    Ins and Outs:  I/O last 24 hours:  In: 4370 [P.O.:660; I.V.:3500; IV Piggyback:210]  Out: 1380 [Urine:835; Drains:245; Blood:300]          Physical:  Vitals:    01/19/24 1638   BP: 122/83   Pulse: 104   Resp: 18   Temp: 99.5 °F (37.5 °C)   SpO2: 94%     Musculoskeletal: bilateral Lower Extremity     Skin warm, dry . No erythema or ecchymosis.  Dressing c/d/I, drain in place  TTP courtney-incisionally  Sensation intact L3-S1  2+ DP pulse  Digits warm and well perfused  Capillary refill < 2 seconds     Lower Extremity Motor Function     Right  Left    Iliopsoas  5/5  5/5    Quadriceps 5/5 5/5   Tibialis anterior  5/5  5/5    EHL  5/5  5/5    Gastroc. muscle  5/5  5/5        Assessment:    58 y.o.male POD 2 L4-5 Decompression and Fusion .     Plan:  WBAT BLE   Lumbar spine precautions  Monitor drain output  Early ambulation  Xrays Lumbar spine completed  Will monitor for ABLA and administer IVF/prbc as indicated for Greater than 2 gram drop or Hgb < 7   PT/OT  Pain control  DVT ppx SCDs  Dispo: pending PT and med clearance    Walt Beard MD      "

## 2024-01-19 NOTE — DISCHARGE SUMMARY
ORTHOPEDICS DISCHARGE SUMMARY  Uche Burnett 58 y.o. male MRN: [id number]  Unit/Bed#:     Attending Physician: Agapito    Admitting diagnosis: Lumbar radiculopathy [M54.16]  Spinal stenosis of lumbar region, unspecified whether neurogenic claudication present [M48.061]    Discharge diagnosis: Lumbar radiculopathy [M54.16]  Spinal stenosis of lumbar region, unspecified whether neurogenic claudication present [M48.061]    Date of admission: 1/18/2024    Date of discharge: 01/21/24     Procedure: NAVIGATED L4-5 DECOMPRESSION/LAMINECTOMY WITH INSTRUMENTED FUSION, TLIF      HPI:  This is a 58 y.o. year old male that presented to the office with signs and symptoms of spinal stenosis with lumbar radiculopathy . They tried and failed conservative treatment measures and wished to proceed with surgical intervention. The risks, benefits, and complications of the procedure were discussed with the patient and informed consent was obtained.    Hospital Course:  The patient was admitted to the hospital on 1/18/2024 and underwent an uncomplicated NAVIGATED L4-5 DECOMPRESSION/LAMINECTOMY WITH INSTRUMENTED FUSION, TLIF. They were transferred to the floor after a brief stay in the post-anesthesia care unit. Their pain was well managed with IV and oral pain medications. They began therapy on post operative day #1.  Daily discussion was had with the patient, nursing staff, orthopaedic team, and family members if present.  All questions were answered to the patients satisfaction.      0   Lab Value Date/Time    HGB 12.9 01/20/2024 0532    HGB 12.8 01/19/2024 1607    HGB 14.8 12/29/2023 0737    HGB 15.0 07/02/2022 0709    HGB 16.5 09/15/2016 1353    HGB 15.1 08/20/2014 0839       Body mass index is 19.2 kg/m². Not obese.    Discharge Instructions:  The patient was discharged weight bearing as tolerated to all extremities. Take pain medications as instructed. Patient must keep LSO brace on when out of bed.    Discharge Medications:  For  the complete list of discharge medications, please refer to the patient's medication reconciliation.

## 2024-01-19 NOTE — OCCUPATIONAL THERAPY NOTE
Occupational Therapy Evaluation     Patient Name: Uche Burnett  Today's Date: 1/19/2024  Problem List  Principal Problem:    Spinal stenosis of lumbar region  Active Problems:    Chronic pain    Past Medical History  Past Medical History:   Diagnosis Date    Arthritis      Past Surgical History  Past Surgical History:   Procedure Laterality Date    LUMBAR FUSION Bilateral 1/18/2024    Procedure: NAVIGATED L4-5 DECOMPRESSION/LAMINECTOMY WITH INSTRUMENTED FUSION, TLIF;  Surgeon: Jamey Altman MD;  Location: [facility];  Service: Orthopedics    ROTATOR CUFF REPAIR           01/19/24 0901   OT Last Visit   OT Visit Date 01/19/24   Note Type   Note type Evaluation   Pain Assessment   Pain Assessment Tool 0-10   Pain Score No Pain   Patient's Stated Pain Goal No pain   Hospital Pain Intervention(s) Repositioned;Ambulation/increased activity;Emotional support   Restrictions/Precautions   Weight Bearing Precautions Per Order Yes   RLE Weight Bearing Per Order WBAT   LLE Weight Bearing Per Order WBAT   Braces or Orthoses (S)  LSO   Other Precautions Agitated;Pain;Spinal precautions   Home Living   Type of Home House   Home Layout Multi-level;Stairs to enter with rails   Additional Comments NO USE OF DME   Prior Function   Level of [place] Independent with ADLs;Independent with functional mobility;Independent with IADLS   Lives With Spouse   Receives Help From Family   IADLs Independent with driving;Independent with meal prep;Independent with medication management   Falls in the last 6 months 0   Vocational Full time employment   Lifestyle   [employer] PT REPORTS BEING I WITH ADLS/IADLS/DRIVING   Reciprocal Relationships LIVES WITH SUPPORTIVE SPOUSE AND ADULT CHILDREN   Service to Others WORKS CONSTRUCTION   Intrinsic Gratification ENJOYS BEING INDEPENDENT   ADL   Eating Assistance 7  Independent   Grooming Assistance 7  Independent   UB Bathing Assistance 7  Independent   LB Bathing Assistance 6  Modified  Independent   UB Dressing Assistance 7  Independent   LB Dressing Assistance 6  Modified independent   Toileting Assistance  6  Modified independent   Functional Assistance 6  Modified independent   Transfers   Sit to Stand 6  Modified independent   Stand to Sit 6  Modified independent   Functional Mobility   Functional Mobility 6  Modified independent   Balance   Static Sitting Normal   Static Standing Normal   Ambulatory Good   Activity Tolerance   Activity Tolerance Patient tolerated treatment well   Medical Staff Made Aware PT SEEN FOR CO-SESSION WITH SKILLED PHYSICAL THERAPIST 2' CLINICALLY UNSTABLE PRESENTATION, NEW PRECAUTIONS/LIMITATIONS, LIMITED ACTIVITY TOLERANCE AND PRESENT IMPAIRMENTS WHICH ARE A REGRESSION FROM THE PT'S BASELINE AND IMPACTING OVERALL OCCUPATIONAL PERFORMANCE.   Nurse Made Aware APPROPRIATE TO SEE   RUE Assessment   RUE Assessment WFL   LUE Assessment   LUE Assessment WFL   Cognition   Overall Cognitive Status WFL   Arousal/Participation Alert;Cooperative   Attention Within functional limits   Orientation Level Oriented X4   Memory Within functional limits   Following Commands Follows all commands and directions without difficulty   Comments PT IS EASILY AGITATED REGARDING PREVIOUS CARE. EVENTUALLY AGREEABLE TO PARTICIPATE IN THERAPY AND COOPERATIVE.   Assessment   Assessment 59 YO Male SEEN FOR INITIAL OCCUPATIONAL THERAPY EVALUATION FOLLOWING S/P L4-5 DECOMPRESSION AND FUSION ON 1/18/24. PT CURRENTLY HAS THE FOLLOWING RESTRICTIONS;SPINAL PRECAUTIONS and LSO . PROBLEMS LIST/PMH INCLUDES ARTHRITIS. PT IS FROM HOME WITH FAMILY WHERE HE REPORTS BEING INDEPENDENT WITH ADLS/IADLS/DRIVING PTA. PT CURRENTLY FUNCTIONING AT AN OVERALL I-MOD I LEVEL WITH ADLS, TRANSFERS AND FUNCTIONAL MOBILITY WITHOUT USE OF AD. PT IS LIMITED 2' PAIN, SPINAL PRECAUTIONS, and OVERALL LIMITED ACTIVITY TOLERANCE. PT EDUCATED ON SPINAL PRECAUTIONS, LSO MANAGEMENT, DEEP BREATHING TECHNIQUES T/O ACTIVITY, SLOWING OF  PACE, INCREASED FAMILY SUPPORT, and CONTINUE PARTICIPATION IN SELF-CARE/MOBILITY WITH STAFF WHILE IN THE HOSPITAL . The patient's raw score on the AM-PAC Daily Activity Inpatient Short Form is 24. A raw score of greater than or equal to 19 suggests the patient may benefit from discharge to home. Please refer to the recommendation of the Occupational Therapist for safe discharge planning. FROM AN OCCUPATIONAL THERAPY PERSPECTIVE, RECOMMEND  HOME WITH FAMILY SUPPORT. ALL QUESTIONS/CONCERNS ADDRESSED. NO ADDITIONAL ACUTE CARE OT NEEDS. D/C OT.   Goals   Patient Goals TO RETURN HOME TODAY   Discharge Recommendation   Rehab Resource Intensity Level, OT No post-acute rehabilitation needs   Equipment Recommended Shower/Tub chair with back ($)   AM-PAC Daily Activity Inpatient   Lower Body Dressing 4   Bathing 4   Toileting 4   Upper Body Dressing 4   Grooming 4   Eating 4   Daily Activity Raw Score 24   Daily Activity Standardized Score (Calc for Raw Score >=11) 57.54   AM-PAC Applied Cognition Inpatient   Following a Speech/Presentation 4   Understanding Ordinary Conversation 4   Taking Medications 4   Remembering Where Things Are Placed or Put Away 4   Remembering List of 4-5 Errands 4   Taking Care of Complicated Tasks 4   Applied Cognition Raw Score 24   Applied Cognition Standardized Score 62.21       Documentation completed by MARISOL Carrera, OTR/L  [facility] Certified ID# [id number]

## 2024-01-19 NOTE — ASSESSMENT & PLAN NOTE
S/p navigated L4-5 decompression/laminectomy with instrumented fusion, TLIF (Bilateral)   Pain controlled  Continue encourage incentive spirometry; monitor fever curve  DVT prophylaxis in place and reviewed

## 2024-01-20 PROBLEM — D72.829 LEUKOCYTOSIS: Status: ACTIVE | Noted: 2024-01-20

## 2024-01-20 LAB
ANION GAP SERPL CALCULATED.3IONS-SCNC: 7 MMOL/L
BASOPHILS # BLD AUTO: 0.08 THOUSANDS/ÂΜL (ref 0–0.1)
BASOPHILS NFR BLD AUTO: 1 % (ref 0–1)
BUN SERPL-MCNC: 15 MG/DL (ref 5–25)
CALCIUM SERPL-MCNC: 8.9 MG/DL (ref 8.4–10.2)
CHLORIDE SERPL-SCNC: 103 MMOL/L (ref 96–108)
CO2 SERPL-SCNC: 28 MMOL/L (ref 21–32)
CREAT SERPL-MCNC: 0.72 MG/DL (ref 0.6–1.3)
EOSINOPHIL # BLD AUTO: 0.21 THOUSAND/ÂΜL (ref 0–0.61)
EOSINOPHIL NFR BLD AUTO: 1 % (ref 0–6)
ERYTHROCYTE [DISTWIDTH] IN BLOOD BY AUTOMATED COUNT: 13.7 % (ref 11.6–15.1)
GFR SERPL CREATININE-BSD FRML MDRD: 104 ML/MIN/1.73SQ M
GLUCOSE SERPL-MCNC: 101 MG/DL (ref 65–140)
HCT VFR BLD AUTO: 38.1 % (ref 36.5–49.3)
HGB BLD-MCNC: 12.9 G/DL (ref 12–17)
IMM GRANULOCYTES # BLD AUTO: 0.06 THOUSAND/UL (ref 0–0.2)
IMM GRANULOCYTES NFR BLD AUTO: 0 % (ref 0–2)
LYMPHOCYTES # BLD AUTO: 5.03 THOUSANDS/ÂΜL (ref 0.6–4.47)
LYMPHOCYTES NFR BLD AUTO: 31 % (ref 14–44)
MCH RBC QN AUTO: 29.9 PG (ref 26.8–34.3)
MCHC RBC AUTO-ENTMCNC: 33.9 G/DL (ref 31.4–37.4)
MCV RBC AUTO: 88 FL (ref 82–98)
MONOCYTES # BLD AUTO: 2.33 THOUSAND/ÂΜL (ref 0.17–1.22)
MONOCYTES NFR BLD AUTO: 14 % (ref 4–12)
NEUTROPHILS # BLD AUTO: 8.59 THOUSANDS/ÂΜL (ref 1.85–7.62)
NEUTS SEG NFR BLD AUTO: 53 % (ref 43–75)
NRBC BLD AUTO-RTO: 0 /100 WBCS
PLATELET # BLD AUTO: 290 THOUSANDS/UL (ref 149–390)
PMV BLD AUTO: 10.4 FL (ref 8.9–12.7)
POTASSIUM SERPL-SCNC: 4.1 MMOL/L (ref 3.5–5.3)
RBC # BLD AUTO: 4.31 MILLION/UL (ref 3.88–5.62)
SODIUM SERPL-SCNC: 138 MMOL/L (ref 135–147)
WBC # BLD AUTO: 16.3 THOUSAND/UL (ref 4.31–10.16)

## 2024-01-20 PROCEDURE — NC001 PR NO CHARGE: Performed by: ORTHOPAEDIC SURGERY

## 2024-01-20 PROCEDURE — 85025 COMPLETE CBC W/AUTO DIFF WBC: CPT

## 2024-01-20 PROCEDURE — 80048 BASIC METABOLIC PNL TOTAL CA: CPT

## 2024-01-20 PROCEDURE — 99232 SBSQ HOSP IP/OBS MODERATE 35: CPT | Performed by: INTERNAL MEDICINE

## 2024-01-20 RX ADMIN — OXYCODONE HYDROCHLORIDE 5 MG: 5 TABLET ORAL at 23:33

## 2024-01-20 RX ADMIN — METHOCARBAMOL TABLETS 750 MG: 750 TABLET, COATED ORAL at 23:33

## 2024-01-20 RX ADMIN — ACETAMINOPHEN 975 MG: 325 TABLET, FILM COATED ORAL at 17:58

## 2024-01-20 RX ADMIN — OXYCODONE HYDROCHLORIDE 5 MG: 5 TABLET ORAL at 18:28

## 2024-01-20 RX ADMIN — METHOCARBAMOL TABLETS 750 MG: 750 TABLET, COATED ORAL at 17:58

## 2024-01-20 RX ADMIN — ACETAMINOPHEN 975 MG: 325 TABLET, FILM COATED ORAL at 23:33

## 2024-01-20 RX ADMIN — METHOCARBAMOL TABLETS 750 MG: 750 TABLET, COATED ORAL at 11:53

## 2024-01-20 RX ADMIN — ACETAMINOPHEN 975 MG: 325 TABLET, FILM COATED ORAL at 09:32

## 2024-01-20 RX ADMIN — OXYCODONE HYDROCHLORIDE 5 MG: 5 TABLET ORAL at 00:36

## 2024-01-20 RX ADMIN — METHOCARBAMOL TABLETS 750 MG: 750 TABLET, COATED ORAL at 06:02

## 2024-01-20 RX ADMIN — METHOCARBAMOL TABLETS 750 MG: 750 TABLET, COATED ORAL at 00:34

## 2024-01-20 RX ADMIN — ACETAMINOPHEN 975 MG: 325 TABLET, FILM COATED ORAL at 00:34

## 2024-01-20 NOTE — PLAN OF CARE
Problem: PAIN - ADULT  Goal: Verbalizes/displays adequate comfort level or baseline comfort level  Description: Interventions:  - Encourage patient to monitor pain and request assistance  - Assess pain using appropriate pain scale  - Administer analgesics based on type and severity of pain and evaluate response  - Implement non-pharmacological measures as appropriate and evaluate response  - Consider cultural and social influences on pain and pain management  - Notify physician/advanced practitioner if interventions unsuccessful or patient reports new pain  Outcome: Progressing     Problem: INFECTION - ADULT  Goal: Absence or prevention of progression during hospitalization  Description: INTERVENTIONS:  - Assess and monitor for signs and symptoms of infection  - Monitor lab/diagnostic results  - Monitor all insertion sites, i.e. indwelling lines, tubes, and drains  - Monitor endotracheal if appropriate and nasal secretions for changes in amount and color  - [place] appropriate cooling/warming therapies per order  - Administer medications as ordered  - Instruct and encourage patient and family to use good hand hygiene technique  - Identify and instruct in appropriate isolation precautions for identified infection/condition  Outcome: Progressing     Problem: MUSCULOSKELETAL - ADULT  Goal: Maintain or return mobility to safest level of function  Description: INTERVENTIONS:  - Assess patient's ability to carry out ADLs; assess patient's baseline for ADL function and identify physical deficits which impact ability to perform ADLs (bathing, care of mouth/teeth, toileting, grooming, dressing, etc.)  - Assess/evaluate cause of self-care deficits   - Assess range of motion  - Assess patient's mobility  - Assess patient's need for assistive devices and provide as appropriate  - Encourage maximum independence but intervene and supervise when necessary  - Involve family in performance of ADLs  - Assess for home care needs  following discharge   - Consider OT consult to assist with ADL evaluation and planning for discharge  - Provide patient education as appropriate  Outcome: Progressing

## 2024-01-20 NOTE — PROGRESS NOTES
[facility]  Progress Note  Name: Uche Burnett I  MRN: [id number]  Unit/Bed#: [facility] I Date of Admission: 1/18/2024   Date of Service: 1/20/2024 I Hospital Day: 0    Assessment/Plan   * Spinal stenosis of lumbar region  Assessment & Plan  S/p navigated L4-5 decompression/laminectomy with instrumented fusion, TLIF (Bilateral)   Pain controlled  Continue encourage incentive spirometry; monitor fever curve  DVT prophylaxis in place and reviewed    Chronic pain  Assessment & Plan  Currently managed under APS    Leukocytosis  Assessment & Plan  Likely reactive  Afebrile without any other symptoms  Monitor and repeat cbc in am             The above assessment and plan was reviewed and updated as determined by my evaluation of the patient on 1/20/2024.    Labs:   Results from last 7 days   Lab Units 01/19/24  1607   WBC Thousand/uL 24.22*   HEMOGLOBIN g/dL 12.8   HEMATOCRIT % 37.3   PLATELETS Thousands/uL 301     Results from last 7 days   Lab Units 01/19/24  0431   SODIUM mmol/L 139   POTASSIUM mmol/L 4.1   CHLORIDE mmol/L 104   CO2 mmol/L 25   BUN mg/dL 17   CREATININE mg/dL 0.78   CALCIUM mg/dL 9.0                   Review of Scheduled Meds:  Current Facility-Administered Medications   Medication Dose Route Frequency Provider Last Rate    acetaminophen  975 mg Oral Q8H Leanne Lynn PA-C      aluminum-magnesium hydroxide-simethicone  30 mL Oral Q6H PRN Leanne Lynn PA-C      calcium carbonate  1,000 mg Oral Daily PRN Leanne Lynn PA-C      docusate sodium  100 mg Oral BID Leanne Lynn PA-C      lactated ringers  100 mL/hr Intravenous Continuous Leanne Lynn PA-C 100 mL/hr (01/18/24 0952)    lactated ringers  125 mL/hr Intravenous Continuous Leanne Lynn PA-C 125 mL/hr (01/19/24 0352)    methocarbamol  750 mg Oral Q6H [facility] Leanne Lynn PA-C      ondansetron  4 mg Intravenous Q6H PRN Leanne Lynn  IBETH      oxyCODONE  10 mg Oral Q4H PRN Leanne Lynn PA-C      oxyCODONE  5 mg Oral Q4H PRN Leanne Lynn PA-C      senna  1 tablet Oral Daily Leanne Lynn PA-C         Subjective/ HPI: Uche Burnett seen and examined. Patients overnight issues or events were reviewed with nursing or staff during rounds or morning huddle session. New or overnight issues include the following:     Pt without any overnight events or reported nursing issues. Anxious to hopefully go home today      ROS:   A 10 point ROS was performed; negative except as noted above.       Imaging:     XR spine lumbar 2 or 3 views injury    (Results Pending)       *Labs /Radiology studies Reviewed  *Medications  reviewed and reconciled as needed  *Please refer to order section for additional ordered labs studies  *Case discussed with primary attending during morning huddle case rounds    Physical Examination:  Vitals:   Vitals:    01/19/24 1935 01/19/24 2015 01/19/24 2328 01/20/24 0344   BP: 92/68 105/62 104/77 106/68   BP Location:   Right arm    Pulse:  79 96 83   Resp: 16 17     Temp: 98.8 °F (37.1 °C)  98.1 °F (36.7 °C) 98.2 °F (36.8 °C)   TempSrc:   Oral    SpO2:  95% 94% 96%   Weight:       Height:           General Appearance: no distress, conversive  HEENT: PERRLA, conjuctiva normal; oropharynx clear; mucous membranes moist;   Neck:  Supple, no lymphadenopathy or thyromegaly  Lungs: clear bilaterally, normal respiratory effort, no retractions, expiratory effort normal  CV: regular rate and rhythm , PMI normal   ABD: soft non tender, no masses , no hepatic or splenomegaly  EXT: DP pulses intact, no lymphadenopathy, no edema; lumbar surgical dressing/drain in place  Skin: normal turgor, normal texture, no rash  Psych: affect normal, mood normal  Neuro: AAOx3    : voiding    The above physical exam was reviewed and updated as determined by my evaluation of the patient on 1/20/2024.    Invasive Devices        Peripheral Intravenous Line  Duration             Peripheral IV 01/18/24 Right Hand 1 day    Peripheral IV 01/18/24 Right;Lower Forearm 1 day              Drain  Duration             Closed/Suction Drain Left Back Bulb 19 Fr. 1 day              Airway  Duration             Non-Surgical Airway Oral pharyngeal airway 90 mm 1 day                       VTE Pharmacologic Prophylaxis: Reason for no pharmacologic prophylaxis surgery  Code Status: No Order  Current Length of Stay: 0 day(s)      Total floor / unit time spent today 30 minutes  Coordination of patient's care was performed in conjunction with primary service. Time invested included review of patient's labs, vitals, and management of their comorbidities with continued monitoring, examination of patient as well as answering patient questions, documenting her findings and creating progress note in electronic medical record,  ordering appropriate diagnostic testing.     ** Please Note:  voice to text software may have been used in the creation of this document. Although proof errors in transcription or interpretation are a potential of such software**

## 2024-01-20 NOTE — PLAN OF CARE
Problem: PAIN - ADULT  Goal: Verbalizes/displays adequate comfort level or baseline comfort level  Description: Interventions:  - Encourage patient to monitor pain and request assistance  - Assess pain using appropriate pain scale  - Administer analgesics based on type and severity of pain and evaluate response  - Implement non-pharmacological measures as appropriate and evaluate response  - Consider cultural and social influences on pain and pain management  - Notify physician/advanced practitioner if interventions unsuccessful or patient reports new pain  Outcome: Progressing     Problem: INFECTION - ADULT  Goal: Absence or prevention of progression during hospitalization  Description: INTERVENTIONS:  - Assess and monitor for signs and symptoms of infection  - Monitor lab/diagnostic results  - Monitor all insertion sites, i.e. indwelling lines, tubes, and drains  - Monitor endotracheal if appropriate and nasal secretions for changes in amount and color  - [place] appropriate cooling/warming therapies per order  - Administer medications as ordered  - Instruct and encourage patient and family to use good hand hygiene technique  - Identify and instruct in appropriate isolation precautions for identified infection/condition  Outcome: Progressing     Problem: DISCHARGE PLANNING  Goal: Discharge to home or other facility with appropriate resources  Description: INTERVENTIONS:  - Identify barriers to discharge w/patient and caregiver  - Arrange for needed discharge resources and transportation as appropriate  - Identify discharge learning needs (meds, wound care, etc.)  - Arrange for interpretive services to assist at discharge as needed  - Refer to Case Management Department for coordinating discharge planning if the patient needs post-hospital services based on physician/advanced practitioner order or complex needs related to functional status, cognitive ability, or social support system  Outcome: Progressing      Problem: Knowledge Deficit  Goal: Patient/family/caregiver demonstrates understanding of disease process, treatment plan, medications, and discharge instructions  Description: Complete learning assessment and assess knowledge base.  Interventions:  - Provide teaching at level of understanding  - Provide teaching via preferred learning methods  Outcome: Progressing     Problem: MUSCULOSKELETAL - ADULT  Goal: Maintain or return mobility to safest level of function  Description: INTERVENTIONS:  - Assess patient's ability to carry out ADLs; assess patient's baseline for ADL function and identify physical deficits which impact ability to perform ADLs (bathing, care of mouth/teeth, toileting, grooming, dressing, etc.)  - Assess/evaluate cause of self-care deficits   - Assess range of motion  - Assess patient's mobility  - Assess patient's need for assistive devices and provide as appropriate  - Encourage maximum independence but intervene and supervise when necessary  - Involve family in performance of ADLs  - Assess for home care needs following discharge   - Consider OT consult to assist with ADL evaluation and planning for discharge  - Provide patient education as appropriate  Outcome: Progressing  Goal: Maintain proper alignment of affected body part  Description: INTERVENTIONS:  - Support, maintain and protect limb and body alignment  - Provide patient/ family with appropriate education  Outcome: Progressing

## 2024-01-21 VITALS
SYSTOLIC BLOOD PRESSURE: 114 MMHG | TEMPERATURE: 98.2 F | OXYGEN SATURATION: 97 % | BODY MASS INDEX: 19.26 KG/M2 | DIASTOLIC BLOOD PRESSURE: 89 MMHG | WEIGHT: 130 LBS | RESPIRATION RATE: 18 BRPM | HEART RATE: 137 BPM | HEIGHT: 69 IN

## 2024-01-21 LAB
ANION GAP SERPL CALCULATED.3IONS-SCNC: 11 MMOL/L
BUN SERPL-MCNC: 15 MG/DL (ref 5–25)
CALCIUM SERPL-MCNC: 9.4 MG/DL (ref 8.4–10.2)
CHLORIDE SERPL-SCNC: 100 MMOL/L (ref 96–108)
CO2 SERPL-SCNC: 26 MMOL/L (ref 21–32)
CREAT SERPL-MCNC: 0.85 MG/DL (ref 0.6–1.3)
GFR SERPL CREATININE-BSD FRML MDRD: 97 ML/MIN/1.73SQ M
GLUCOSE SERPL-MCNC: 169 MG/DL (ref 65–140)
POTASSIUM SERPL-SCNC: 4.1 MMOL/L (ref 3.5–5.3)
SODIUM SERPL-SCNC: 137 MMOL/L (ref 135–147)

## 2024-01-21 PROCEDURE — NC001 PR NO CHARGE: Performed by: ORTHOPAEDIC SURGERY

## 2024-01-21 PROCEDURE — 99024 POSTOP FOLLOW-UP VISIT: CPT | Performed by: ORTHOPAEDIC SURGERY

## 2024-01-21 PROCEDURE — 80048 BASIC METABOLIC PNL TOTAL CA: CPT

## 2024-01-21 PROCEDURE — 99232 SBSQ HOSP IP/OBS MODERATE 35: CPT | Performed by: INTERNAL MEDICINE

## 2024-01-21 RX ORDER — OXYCODONE HYDROCHLORIDE 5 MG/1
5 TABLET ORAL EVERY 6 HOURS PRN
Qty: 30 TABLET | Refills: 0 | Status: SHIPPED | OUTPATIENT
Start: 2024-01-21

## 2024-01-21 RX ORDER — METHOCARBAMOL 750 MG/1
750 TABLET, FILM COATED ORAL EVERY 6 HOURS PRN
Qty: 32 TABLET | Refills: 0 | Status: SHIPPED | OUTPATIENT
Start: 2024-01-21 | End: 2024-01-25 | Stop reason: SDUPTHER

## 2024-01-21 RX ADMIN — METHOCARBAMOL TABLETS 750 MG: 750 TABLET, COATED ORAL at 05:37

## 2024-01-21 RX ADMIN — ACETAMINOPHEN 975 MG: 325 TABLET, FILM COATED ORAL at 08:05

## 2024-01-21 NOTE — PLAN OF CARE
Problem: PAIN - ADULT  Goal: Verbalizes/displays adequate comfort level or baseline comfort level  Description: Interventions:  - Encourage patient to monitor pain and request assistance  - Assess pain using appropriate pain scale  - Administer analgesics based on type and severity of pain and evaluate response  - Implement non-pharmacological measures as appropriate and evaluate response  - Consider cultural and social influences on pain and pain management  - Notify physician/advanced practitioner if interventions unsuccessful or patient reports new pain  Outcome: Adequate for Discharge     Problem: INFECTION - ADULT  Goal: Absence or prevention of progression during hospitalization  Description: INTERVENTIONS:  - Assess and monitor for signs and symptoms of infection  - Monitor lab/diagnostic results  - Monitor all insertion sites, i.e. indwelling lines, tubes, and drains  - Monitor endotracheal if appropriate and nasal secretions for changes in amount and color  - [place] appropriate cooling/warming therapies per order  - Administer medications as ordered  - Instruct and encourage patient and family to use good hand hygiene technique  - Identify and instruct in appropriate isolation precautions for identified infection/condition  Outcome: Adequate for Discharge     Problem: DISCHARGE PLANNING  Goal: Discharge to home or other facility with appropriate resources  Description: INTERVENTIONS:  - Identify barriers to discharge w/patient and caregiver  - Arrange for needed discharge resources and transportation as appropriate  - Identify discharge learning needs (meds, wound care, etc.)  - Arrange for interpretive services to assist at discharge as needed  - Refer to Case Management Department for coordinating discharge planning if the patient needs post-hospital services based on physician/advanced practitioner order or complex needs related to functional status, cognitive ability, or social support  system  Outcome: Adequate for Discharge     Problem: Knowledge Deficit  Goal: Patient/family/caregiver demonstrates understanding of disease process, treatment plan, medications, and discharge instructions  Description: Complete learning assessment and assess knowledge base.  Interventions:  - Provide teaching at level of understanding  - Provide teaching via preferred learning methods  Outcome: Adequate for Discharge     Problem: MUSCULOSKELETAL - ADULT  Goal: Maintain or return mobility to safest level of function  Description: INTERVENTIONS:  - Assess patient's ability to carry out ADLs; assess patient's baseline for ADL function and identify physical deficits which impact ability to perform ADLs (bathing, care of mouth/teeth, toileting, grooming, dressing, etc.)  - Assess/evaluate cause of self-care deficits   - Assess range of motion  - Assess patient's mobility  - Assess patient's need for assistive devices and provide as appropriate  - Encourage maximum independence but intervene and supervise when necessary  - Involve family in performance of ADLs  - Assess for home care needs following discharge   - Consider OT consult to assist with ADL evaluation and planning for discharge  - Provide patient education as appropriate  Outcome: Adequate for Discharge  Goal: Maintain proper alignment of affected body part  Description: INTERVENTIONS:  - Support, maintain and protect limb and body alignment  - Provide patient/ family with appropriate education  Outcome: Adequate for Discharge

## 2024-01-21 NOTE — PROGRESS NOTES
Progress Note - Orthopedics   Uche Burnett 58 y.o. male MRN: [id number]  Unit/Bed#: -01      Subjective:    58 y.o.male POD 3 L4-5 Decompression and Fusion. No acute events, no new complaints. Pain well controlled. Denies fevers, chills, CP, SOB, N/V, numbness or tingling. Patient reports no issues with urination or bowel movements. Patient states he is ready to go home.    Labs:  0   Lab Value Date/Time    HCT 38.1 01/20/2024 0532    HCT 37.3 01/19/2024 1607    HCT 44.1 12/29/2023 0737    HCT 44.3 08/20/2014 0839    HGB 12.9 01/20/2024 0532    HGB 12.8 01/19/2024 1607    HGB 14.8 12/29/2023 0737    HGB 15.1 08/20/2014 0839    INR 1.00 12/29/2023 0737    WBC 16.30 (H) 01/20/2024 0532    WBC 24.22 (H) 01/19/2024 1607    WBC 10.04 12/29/2023 0737    WBC 12.03 (H) 08/20/2014 0839       Meds:    Current Facility-Administered Medications:     acetaminophen (TYLENOL) tablet 975 mg, 975 mg, Oral, Q8H, Leanne Lynn PA-C, 975 mg at 01/20/24 1758    aluminum-magnesium hydroxide-simethicone (MAALOX) oral suspension 30 mL, 30 mL, Oral, Q6H PRN, Leanne Lynn PA-C    calcium carbonate (TUMS) chewable tablet 1,000 mg, 1,000 mg, Oral, Daily PRN, Leanne Lynn PA-C    docusate sodium (COLACE) capsule 100 mg, 100 mg, Oral, BID, Leanne Lynn PA-C, 100 mg at 01/18/24 1940    lactated ringers infusion, 100 mL/hr, Intravenous, Continuous, Leanne Lynn PA-C, Last Rate: 100 mL/hr at 01/18/24 0952, New Bag at 01/18/24 1321    lactated ringers infusion, 125 mL/hr, Intravenous, Continuous, Leanne Lynn PA-C, Last Rate: 125 mL/hr at 01/19/24 0352, 125 mL/hr at 01/19/24 0352    methocarbamol (ROBAXIN) tablet 750 mg, 750 mg, Oral, Q6H INDRA, Leanne Lynn PA-C, 750 mg at 01/20/24 1758    ondansetron (ZOFRAN) injection 4 mg, 4 mg, Intravenous, Q6H PRN, Leanne Lynn PA-C    oxyCODONE (ROXICODONE) IR tablet 10 mg, 10 mg, Oral, Q4H PRN, Leanne Lynn,  "IBETH    oxyCODONE (ROXICODONE) IR tablet 5 mg, 5 mg, Oral, Q4H PRN, Leanne Lynn PA-C, 5 mg at 01/20/24 1828    senna (SENOKOT) tablet 8.6 mg, 1 tablet, Oral, Daily, Leanne Lynn PA-C    Blood Culture:   No results found for: \"BLOODCX\"    Wound Culture:   No results found for: \"WOUNDCULT\"    Ins and Outs:  I/O last 24 hours:  In: -   Out: 122 [Drains:122]          Physical:  Vitals:    01/20/24 2202   BP: 115/82   Pulse: 103   Resp: 18   Temp: 98.2 °F (36.8 °C)   SpO2: 95%     Musculoskeletal: bilateral Lower Extremity     Skin warm, dry . No erythema or ecchymosis.  Dressing c/d/I, drain in place  TTP courtney-incisionally  Sensation intact L3-S1  2+ DP pulse  Digits warm and well perfused  Capillary refill < 2 seconds     Lower Extremity Motor Function     Right  Left    Iliopsoas  5/5  5/5    Quadriceps 5/5 5/5   Tibialis anterior  5/5  5/5    EHL  5/5  5/5    Gastroc. muscle  5/5  5/5        Assessment:    58 y.o.male POD 3 L4-5 Decompression and Fusion.     Plan:  WBAT BLE   Lumbar spine precautions  Monitor drain output  Early ambulation  Xrays Lumbar spine completed  Will monitor for ABLA and administer IVF/prbc as indicated for Greater than 2 gram drop or Hgb < 7   PT/OT  Pain control  DVT ppx SCDs  Dispo: pending PT and med clearance    Walt Beard MD      "

## 2024-01-21 NOTE — DISCHARGE SUMMARY
ORTHOPEDICS DISCHARGE SUMMARY  Uche Burnett 58 y.o. male MRN: [id number]  Unit/Bed#: -01    Attending Physician: Agapito    Admitting diagnosis: Lumbar radiculopathy [M54.16]  Spinal stenosis of lumbar region, unspecified whether neurogenic claudication present [M48.061]    Discharge diagnosis: Lumbar radiculopathy [M54.16]  Spinal stenosis of lumbar region, unspecified whether neurogenic claudication present [M48.061]    Date of admission: 1/18/2024    Date of discharge: 01/21/24    Procedure: NAVIGATED L4-5 DECOMPRESSION/LAMINECTOMY WITH INSTRUMENTED FUSION, TLIF      HPI:  This is a 58 y.o. year old male that presented to the office with signs and symptoms of spinal stenosis with lumbar radiculopathy. They tried and failed conservative treatment measures and wished to proceed with surgical intervention. The risks, benefits, and complications of the procedure were discussed with the patient and informed consent was obtained.    Hospital Course:  The patient was admitted to the hospital on 1/18/2024 and underwent an uncomplicated L4-L5 decompression with instrumented fusion, TLIF. They were transferred to the floor after a brief stay in the post-anesthesia care unit. Their pain was well managed with IV and oral pain medications. They began therapy on post operative day #1.  Daily discussion was had with the patient, nursing staff, orthopaedic team, and family members if present.  All questions were answered to the patients satisfaction.    0   Lab Value Date/Time    HGB 12.9 01/20/2024 0532    HGB 12.8 01/19/2024 1607    HGB 14.8 12/29/2023 0737    HGB 15.0 07/02/2022 0709    HGB 16.5 09/15/2016 1353    HGB 15.1 08/20/2014 0839     Greater than 2 gram drop which qualifies for diagnosis of acute blood loss anemia.  Vital signs remained stable and pt was resuscitated with IVF as needed    Discharge Instructions:  The patient was discharged weight bearing as tolerated to all extremities. Take pain medications  as instructed. Patient must keep LSO brace on when out of bed.    Discharge Medications:  For the complete list of discharge medications, please refer to the patient's medication reconciliation.

## 2024-01-21 NOTE — PROGRESS NOTES
[facility]  Progress Note  Name: Uche Burnett I  MRN: [id number]  Unit/Bed#: MS Sara-Trini I Date of Admission: 1/18/2024   Date of Service: 1/21/2024 I Hospital Day: 0    Assessment/Plan   * Spinal stenosis of lumbar region  Assessment & Plan  S/p navigated L4-5 decompression/laminectomy with instrumented fusion, TLIF (Bilateral)   Pain controlled  Continue encourage incentive spirometry; monitor fever curve  DVT prophylaxis in place and reviewed      Chronic pain  Assessment & Plan  Currently managed under APS  controlled    Leukocytosis  Assessment & Plan  Likely reactive  improved  Afebrile without any other symptoms               The above assessment and plan was reviewed and updated as determined by my evaluation of the patient on 1/21/2024.    Labs:   Results from last 7 days   Lab Units 01/20/24  0532 01/19/24  1607   WBC Thousand/uL 16.30* 24.22*   HEMOGLOBIN g/dL 12.9 12.8   HEMATOCRIT % 38.1 37.3   PLATELETS Thousands/uL 290 301     Results from last 7 days   Lab Units 01/20/24  0532 01/19/24  0431   SODIUM mmol/L 138 139   POTASSIUM mmol/L 4.1 4.1   CHLORIDE mmol/L 103 104   CO2 mmol/L 28 25   BUN mg/dL 15 17   CREATININE mg/dL 0.72 0.78   CALCIUM mg/dL 8.9 9.0                   Review of Scheduled Meds:  Current Facility-Administered Medications   Medication Dose Route Frequency Provider Last Rate    acetaminophen  975 mg Oral Q8H Leanne Lynn PA-C      aluminum-magnesium hydroxide-simethicone  30 mL Oral Q6H PRN Leanne Lynn PA-C      calcium carbonate  1,000 mg Oral Daily PRN Leanne Lynn PA-C      docusate sodium  100 mg Oral BID Leanne Lynn PA-C      lactated ringers  100 mL/hr Intravenous Continuous Leanne Lynn PA-C 100 mL/hr (01/18/24 0952)    lactated ringers  125 mL/hr Intravenous Continuous Leanne Lynn PA-C 125 mL/hr (01/19/24 0352)    methocarbamol  750 mg Oral Q6H [facility] Leanne Gonzalez  IBETH Lynn      ondansetron  4 mg Intravenous Q6H PRN Leanne Lynn PA-C      oxyCODONE  10 mg Oral Q4H PRN Leanne Lynn PA-C      oxyCODONE  5 mg Oral Q4H PRN Leanne Lynn PA-C      senna  1 tablet Oral Daily Leanne Lynn PA-C         Subjective/ HPI: Patient seen and examined. Patients overnight issues or events were reviewed with nursing or staff during rounds or morning huddle session. New or overnight issues include the following:     Pt agitated this am regarding dc planning. Will reach out to orthopedics to see pt. Otherwise denies any pain      ROS:   A 10 point ROS was performed; negative except as noted above.       Imaging:     XR spine lumbar 2 or 3 views injury    (Results Pending)       *Labs /Radiology studies Reviewed  *Medications  reviewed and reconciled as needed  *Please refer to order section for additional ordered labs studies  *Case discussed with primary attending during morning huddle case rounds    Physical Examination:  Vitals:   Vitals:    01/20/24 1504 01/20/24 1910 01/20/24 1930 01/20/24 2202   BP: 127/75 115/82  115/82   BP Location:  Right arm  Right arm   Pulse: 99 (!) 114  103   Resp: 18 18  18   Temp: 98 °F (36.7 °C) 98.4 °F (36.9 °C)  98.2 °F (36.8 °C)   TempSrc:  Oral  Oral   SpO2: 97% 96% 94% 95%   Weight:       Height:           General Appearance: no distress, conversive  HEENT: PERRLA, conjuctiva normal; oropharynx clear; mucous membranes moist;   Neck:  Supple, no lymphadenopathy or thyromegaly  Lungs: CTA, normal respiratory effort, no retractions, expiratory effort normal  CV: regular rate and rhythm , PMI normal   ABD: soft non tender, no masses , no hepatic or splenomegaly  EXT: DP pulses intact, no lymphadenopathy, no edema; lumbar drain and surgical dressing in place, dressing is saturated with serosanguinous fluid  Skin: normal turgor, normal texture, no rash  Psych: affect normal, mood normal  Neuro: AAOx3    :  voiding    The above physical exam was reviewed and updated as determined by my evaluation of the patient on 1/21/2024.    Invasive Devices       Peripheral Intravenous Line  Duration             Peripheral IV 01/18/24 Right Hand 2 days    Peripheral IV 01/18/24 Right;Lower Forearm 2 days              Drain  Duration             Closed/Suction Drain Left Back Bulb 19 Fr. 2 days              Airway  Duration             Non-Surgical Airway Oral pharyngeal airway 90 mm 2 days                       VTE Pharmacologic Prophylaxis:  ambulatory  Code Status: No Order  Current Length of Stay: 0 day(s)      Total floor / unit time spent today 30 minutes  Coordination of patient's care was performed in conjunction with primary service. Time invested included review of patient's labs, vitals, and management of their comorbidities with continued monitoring, examination of patient as well as answering patient questions, documenting her findings and creating progress note in electronic medical record,  ordering appropriate diagnostic testing. Medical decision making for the day was made by supervising physician unless otherwise noted in their attestation statement.    ** Please Note:  voice to text software may have been used in the creation of this document. Although proof errors in transcription or interpretation are a potential of such software**

## 2024-01-25 ENCOUNTER — TELEPHONE (OUTPATIENT)
Dept: OBGYN CLINIC | Facility: HOSPITAL | Age: 59
End: 2024-01-25

## 2024-01-25 DIAGNOSIS — G89.29 OTHER CHRONIC PAIN: ICD-10-CM

## 2024-01-25 RX ORDER — METHOCARBAMOL 750 MG/1
750 TABLET, FILM COATED ORAL EVERY 6 HOURS PRN
Qty: 32 TABLET | Refills: 0 | Status: SHIPPED | OUTPATIENT
Start: 2024-01-25 | End: 2024-02-02

## 2024-01-25 NOTE — TELEPHONE ENCOUNTER
Called and spoke with Uche advised him he can use a heating pad just not over his incision. I let him know that Dr. Altman is in the OR currently and we will call him back when Dr. Altman is back in office.

## 2024-01-25 NOTE — TELEPHONE ENCOUNTER
Caller: Patient    Doctor: Agapito    Reason for call: Patient states he is having back spasms away from the fusion surgery, raid shoulders, ribs, obliques, upper back etc. Wondering if this is from the nerves from the surgery. He is not sure if this is normal but is in a lot of discomfort. Wound area looks good.   He explained the Robaxin is not working for pain/spasms. He would like a call from physician or nurse.    He had surgery 1/18/24.     Call back#: [phone]

## 2024-01-25 NOTE — TELEPHONE ENCOUNTER
NAVIGATED L4-5 DECOMPRESSION/LAMINECTOMY WITH INSTRUMENTED FUSION, TLIF (Bilateral: Spine Lumbar)  on 1/18/24.  Reviewed AVS prior to calling pt.  Called and spoke w/pt. Pt states that he is having pain in neck, shoulders, obliques, under shoulder and upper back. Pain is 10/10. No pain in legs or weakness.  No problems w/bowel or bladder.  Has had BM. Pt is not aware that he is sitting for greater than 20 mins (as per AVS). He has been walking.  Is wondering if brace has changed posture and is causing pain.  He is taking tylenol, Robaxin (prescribed by Dr IRINEO Liriano), and oxycodone (from Dr. GENARO Miramontes).  Can he use heating pad on shoulders and lower back.  He is not asking to increase pain meds.  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 He just needs to know if this is normal or if something more can done for pain. Pt is crying on phone.  Please review and advise.

## 2024-01-29 ENCOUNTER — OFFICE VISIT (OUTPATIENT)
Dept: OBGYN CLINIC | Facility: HOSPITAL | Age: 59
End: 2024-01-29

## 2024-01-29 ENCOUNTER — HOSPITAL ENCOUNTER (OUTPATIENT)
Dept: RADIOLOGY | Facility: HOSPITAL | Age: 59
Discharge: HOME | End: 2024-01-29
Attending: ORTHOPAEDIC SURGERY
Payer: COMMERCIAL

## 2024-01-29 VITALS — BODY MASS INDEX: 19.27 KG/M2 | HEIGHT: 69 IN | WEIGHT: 130.07 LBS

## 2024-01-29 DIAGNOSIS — Z98.1 S/P SPINAL FUSION: ICD-10-CM

## 2024-01-29 DIAGNOSIS — M54.16 LUMBAR RADICULOPATHY: Primary | ICD-10-CM

## 2024-01-29 PROCEDURE — 72100 X-RAY EXAM L-S SPINE 2/3 VWS: CPT

## 2024-01-29 PROCEDURE — 99024 POSTOP FOLLOW-UP VISIT: CPT | Performed by: ORTHOPAEDIC SURGERY

## 2024-01-29 NOTE — PROGRESS NOTES
"POST OP NOTE       Uche Burnett here today s/p L4-5 decompression/laminectomy with instrumented fusion, TLIF      Surgery date: 1/18/2024    Subjective: Preoperative symptoms were back and bilateral lower extremity pain. Muscle cramping.Today, he reports significant improvement in pain and symptoms. Leg pain has resolved. Has been walking about 1 mile a day without issue.  Wearing LSO brace when out of bed for comfort. Patient admits to compliance with activity restrictions. Denies any fevers, chills, and night sweats.  No cough, no shortness of breath. No chest pain, no palpitations. No dysphagia. Not smoking.    Post-Op Medications:  750mg robaxin q6hr prn - taking as prescribed  5mg oxycodone q4hr prn - taking as needed, has not taken since the weekend  Tylenol - taking as needed      Objective:  Ht 5' 9\" (1.753 m)   Wt 59 kg (130 lb 1.1 oz)   BMI 19.21 kg/m²     Strength:  Lower Extremity Motor Function     Right  Left    Iliopsoas  5/5  5/5    Quadriceps 5/5 5/5   Tibialis anterior  5/5  5/5    EHL  5/5  5/5    Gastroc. muscle  5/5  5/5    Heel rise  5/5  5/5    Toe rise  5/5  5/5      Sensation: intact LT all distributions   DTR: intact  Gait: fluid, non antalgic    Posterior lumbar incision healing extremely well. Staples intact. No signs of erythema, discharge, or purulence.  There is no appreciable effusion.    Calf: soft, non tender, no swelling or erythema     Imaging: I have reviewed and independently visualized the imaging studies (01/29/24)  myself and my interpretation is the following: Instrumentation in place and in good alignment.    Assessment: s/p L4-5 decompression with instrumented fusion, TLIF on 1/18/2024. Overall doing well.    Plan:  Staples were removed. Steri strips reapplied.  Patient was instructed to do the following   -Able to walk as much as tolerated. Wear LSO brace when out of bed for comfort purposes. Will continue to hold off on formal physical therapy at this time. Continue " to limit vigorous turning, twisting and bending. No heavy lifting.  -May shower and allow water/soap to run over incision. Do not scrub or submerge incision.  -Take pain medication as prescribed if needed.   No refill required at this time.   -Follow up in 4 weeks for 6 week post op visit. Will get repeat xrays.  Uche Burnett was instructed to call the office with any concerns or questions.

## 2024-02-21 DIAGNOSIS — G89.29 OTHER CHRONIC PAIN: ICD-10-CM

## 2024-02-21 RX ORDER — METHOCARBAMOL 750 MG/1
750 TABLET, FILM COATED ORAL EVERY 6 HOURS PRN
Qty: 32 TABLET | Refills: 0 | Status: SHIPPED | OUTPATIENT
Start: 2024-02-21 | End: 2024-02-29

## 2024-02-21 NOTE — TELEPHONE ENCOUNTER
Reason for call:   [x] Refill   [] Prior Auth  [] Other:     Office:   [] PCP/Provider -   [x] Specialty/Provider - ortho    Medication: methocarbamol (Robaxin-750) 750 mg tablet     Dose/Frequency: Take 1 tablet (750 mg total) by mouth every 6 (six) hours as needed for muscle spasms     Quantity: 32    Pharmacy: [facility]/pharmacy #[id number] - [place] [phone]     Does the patient have enough for 3 days?   [] Yes   [x] No - Send as HP to POD

## 2024-02-26 ENCOUNTER — HOSPITAL ENCOUNTER (OUTPATIENT)
Dept: RADIOLOGY | Facility: HOSPITAL | Age: 59
Discharge: HOME | End: 2024-02-26
Attending: ORTHOPAEDIC SURGERY
Payer: COMMERCIAL

## 2024-02-26 ENCOUNTER — OFFICE VISIT (OUTPATIENT)
Dept: OBGYN CLINIC | Facility: HOSPITAL | Age: 59
End: 2024-02-26

## 2024-02-26 VITALS
DIASTOLIC BLOOD PRESSURE: 78 MMHG | BODY MASS INDEX: 19.27 KG/M2 | SYSTOLIC BLOOD PRESSURE: 125 MMHG | WEIGHT: 130.07 LBS | HEART RATE: 96 BPM | HEIGHT: 69 IN

## 2024-02-26 DIAGNOSIS — Z98.1 S/P SPINAL FUSION: ICD-10-CM

## 2024-02-26 DIAGNOSIS — Z98.1 S/P SPINAL FUSION: Primary | ICD-10-CM

## 2024-02-26 PROCEDURE — 99024 POSTOP FOLLOW-UP VISIT: CPT | Performed by: ORTHOPAEDIC SURGERY

## 2024-02-26 PROCEDURE — 72100 X-RAY EXAM L-S SPINE 2/3 VWS: CPT

## 2024-02-26 NOTE — PROGRESS NOTES
"POST OP NOTE       Uche Burnett here today s/p L4-5 decompression/laminectomy with instrumented fusion, TLIF      Surgery date: 1/18/2024    Subjective: Preoperative symptoms were back and bilateral lower extremity pain. Muscle cramping. Today, he reports continued significant improvement in pain and symptoms. Leg pain has resolved. Has been walking multiple miles a day without issue.  He discontinued LSO brace as he states it was causing discomfort.   Patient admits to compliance with activity restrictions. Denies any fevers, chills, and night sweats.  No cough, no shortness of breath. No chest pain, no palpitations. No dysphagia. Not smoking, but has been vaping.    Post-Op Medications:  750mg robaxin q6hr prn - taking as needed, about once a day  Tylenol - taking as needed, about once a day      Objective:  /78   Pulse 96   Ht 5' 9\" (1.753 m)   Wt 59 kg (130 lb 1.1 oz)   BMI 19.21 kg/m²     Strength:  Lower Extremity Motor Function     Right  Left    Iliopsoas  5/5  5/5    Quadriceps 5/5 5/5   Tibialis anterior  5/5  5/5    EHL  5/5  5/5    Gastroc. muscle  5/5  5/5    Heel rise  5/5  5/5    Toe rise  5/5  5/5      Sensation: intact LT all distributions   DTR: intact  Gait: fluid, non antalgic    Posterior lumbar incision healing extremely well. Staples intact. No signs of erythema, discharge, or purulence.  There is no appreciable effusion.    Calf: soft, non tender, no swelling or erythema     Imaging: I have reviewed and independently visualized the imaging studies (02/26/24)  myself and my interpretation is the following: Instrumentation in place and in good alignment.    Assessment: s/p L4-5 decompression with instrumented fusion, TLIF on 1/18/2024. Overall doing well.    Plan:  Patient was instructed to do the following   -Able to walk as much as tolerated. Continue to limit vigorous turning, twisting and bending. No heavy lifting.  -Physical therapy prescription provided to the patient to work " on isometric core strengthening, lumbar strengthening, lower extremity stretching and strengthening  -Take pain medication as prescribed if needed.   No refill required at this time.   -Follow up in 6 weeks for 3 month post op visit. Will get repeat xrays.  Uche Burnett was instructed to call the office with any concerns or questions.

## 2024-05-29 ENCOUNTER — TELEPHONE (OUTPATIENT)
Dept: OBGYN CLINIC | Facility: HOSPITAL | Age: 59
End: 2024-05-29

## 2024-05-29 NOTE — TELEPHONE ENCOUNTER
SHRAVAN for pt to call and reschedule due to provider being OOO that week. He can be put on Dr. Altman schedule for the week before or week after and forced on if needed.

## 2024-07-01 ENCOUNTER — TELEPHONE (OUTPATIENT)
Dept: OBGYN CLINIC | Facility: HOSPITAL | Age: 59
End: 2024-07-01

## 2024-07-01 NOTE — TELEPHONE ENCOUNTER
Called and reschedule pt's appointment for 7/1/24 since provider needs to leave for a meeting. Pt was upset and frustrated that this was happening the day of since his original appointment had to be reschedule since provider was OOO. Pt states he received confirmation text for this appointment. I advised pt that this was not something the office knew about until today. I gave him the options to reschedule his appointment or talk to a supervisor since he was upset with being rescheduled mult times. He declined both options and stated if his appointment is not going to be at 4:30 then to cancel it. I did let him know the provider will not be in office at 4:30 so his appointment will be cancel. Phone call was the disconnected.

## 2025-03-02 ENCOUNTER — HOSPITAL ENCOUNTER (EMERGENCY)
Facility: HOSPITAL | Age: 60
Discharge: HOME | End: 2025-03-02
Attending: EMERGENCY MEDICINE | Admitting: EMERGENCY MEDICINE
Payer: COMMERCIAL

## 2025-03-02 ENCOUNTER — APPOINTMENT (EMERGENCY)
Dept: RADIOLOGY | Facility: HOSPITAL | Age: 60
End: 2025-03-02
Payer: COMMERCIAL

## 2025-03-02 VITALS
HEART RATE: 84 BPM | RESPIRATION RATE: 16 BRPM | TEMPERATURE: 97.8 F | OXYGEN SATURATION: 98 % | DIASTOLIC BLOOD PRESSURE: 81 MMHG | SYSTOLIC BLOOD PRESSURE: 124 MMHG

## 2025-03-02 DIAGNOSIS — M79.642 LEFT HAND PAIN: Primary | ICD-10-CM

## 2025-03-02 LAB
ANION GAP SERPL CALCULATED.3IONS-SCNC: 8 MMOL/L (ref 4–13)
BASOPHILS # BLD AUTO: 0.1 THOUSANDS/ÂΜL (ref 0–0.1)
BASOPHILS NFR BLD AUTO: 1 % (ref 0–1)
BUN SERPL-MCNC: 19 MG/DL (ref 5–25)
CALCIUM SERPL-MCNC: 9.3 MG/DL (ref 8.4–10.2)
CHLORIDE SERPL-SCNC: 105 MMOL/L (ref 96–108)
CO2 SERPL-SCNC: 25 MMOL/L (ref 21–32)
CREAT SERPL-MCNC: 0.87 MG/DL (ref 0.6–1.3)
CRP SERPL QL: 1.2 MG/L
EOSINOPHIL # BLD AUTO: 0.25 THOUSAND/ÂΜL (ref 0–0.61)
EOSINOPHIL NFR BLD AUTO: 1 % (ref 0–6)
ERYTHROCYTE [DISTWIDTH] IN BLOOD BY AUTOMATED COUNT: 13.7 % (ref 11.6–15.1)
ERYTHROCYTE [SEDIMENTATION RATE] IN BLOOD: 2 MM/HOUR (ref 0–19)
GFR SERPL CREATININE-BSD FRML MDRD: 95 ML/MIN/1.73SQ M
GLUCOSE SERPL-MCNC: 105 MG/DL (ref 65–140)
HCT VFR BLD AUTO: 42.4 % (ref 36.5–49.3)
HGB BLD-MCNC: 14.2 G/DL (ref 12–17)
IMM GRANULOCYTES # BLD AUTO: 0.07 THOUSAND/UL (ref 0–0.2)
IMM GRANULOCYTES NFR BLD AUTO: 0 % (ref 0–2)
LYMPHOCYTES # BLD AUTO: 4.62 THOUSANDS/ÂΜL (ref 0.6–4.47)
LYMPHOCYTES NFR BLD AUTO: 26 % (ref 14–44)
MCH RBC QN AUTO: 29.8 PG (ref 26.8–34.3)
MCHC RBC AUTO-ENTMCNC: 33.5 G/DL (ref 31.4–37.4)
MCV RBC AUTO: 89 FL (ref 82–98)
MONOCYTES # BLD AUTO: 1.7 THOUSAND/ÂΜL (ref 0.17–1.22)
MONOCYTES NFR BLD AUTO: 10 % (ref 4–12)
NEUTROPHILS # BLD AUTO: 10.87 THOUSANDS/ÂΜL (ref 1.85–7.62)
NEUTS SEG NFR BLD AUTO: 62 % (ref 43–75)
NRBC BLD AUTO-RTO: 0 /100 WBCS
PLATELET # BLD AUTO: 336 THOUSANDS/UL (ref 149–390)
PMV BLD AUTO: 10.2 FL (ref 8.9–12.7)
POTASSIUM SERPL-SCNC: 5.1 MMOL/L (ref 3.5–5.3)
RBC # BLD AUTO: 4.76 MILLION/UL (ref 3.88–5.62)
SODIUM SERPL-SCNC: 138 MMOL/L (ref 135–147)
WBC # BLD AUTO: 17.61 THOUSAND/UL (ref 4.31–10.16)

## 2025-03-02 PROCEDURE — 99283 EMERGENCY DEPT VISIT LOW MDM: CPT

## 2025-03-02 PROCEDURE — 86140 C-REACTIVE PROTEIN: CPT

## 2025-03-02 PROCEDURE — 73130 X-RAY EXAM OF HAND: CPT

## 2025-03-02 PROCEDURE — 85652 RBC SED RATE AUTOMATED: CPT

## 2025-03-02 PROCEDURE — 36415 COLL VENOUS BLD VENIPUNCTURE: CPT

## 2025-03-02 PROCEDURE — 80048 BASIC METABOLIC PNL TOTAL CA: CPT

## 2025-03-02 PROCEDURE — 85025 COMPLETE CBC W/AUTO DIFF WBC: CPT

## 2025-03-02 PROCEDURE — 99284 EMERGENCY DEPT VISIT MOD MDM: CPT | Performed by: EMERGENCY MEDICINE

## 2025-03-02 RX ORDER — ACETAMINOPHEN 325 MG/1
975 TABLET ORAL ONCE
Status: COMPLETED | OUTPATIENT
Start: 2025-03-02 | End: 2025-03-02

## 2025-03-02 RX ADMIN — ACETAMINOPHEN 975 MG: 325 TABLET, FILM COATED ORAL at 14:57

## 2025-03-02 NOTE — ED ATTENDING ATTESTATION
3/2/2025  I, Cristiano Mota MD, saw and evaluated the patient. I have discussed the patient with the resident/non-physician practitioner and agree with the resident's/non-physician practitioner's findings, Plan of Care, and MDM as documented in the resident's/non-physician practitioner's note, except where noted. All available labs and Radiology studies were reviewed.  I was present for key portions of any procedure(s) performed by the resident/non-physician practitioner and I was immediately available to provide assistance.       At this point I agree with the current assessment done in the Emergency Department.  I have conducted an independent evaluation of this patient a history and physical is as follows:    This is a 59-year-old male patient on a significantly related past medical history presenting to the ED today for complaint of left hand swelling.  Patient has swelling at the second MCP.  He states he was cracking his fingers, and shortly thereafter developed a jolt of pain, with some associated swelling.  Patient denies any nausea vomiting, chest pain, shortness of breath, fever chills or sweats, paresthesias, or any other significantly associated symptoms.  On exam he has some tenderness to palpation of the second MCP, without any obvious palpable deformities.  He has pain with movement, both passive and active range of motion at that MCP.  He has intact sensation distally, intact cap refill distally.  His differential diagnosis includes: Fracture versus dislocation versus contusion versus septic arthritis versus other.  I have low suspicion for septic arthritis, considering the fact that he does not have any infectious symptoms, has not ever done any IV drugs, and has not had any instrumentation, does not have any overlying wounds I do think that is less likely that it is septic in presentation.  Patient had sed rate and CRP which were normal, which is very unlikely for her to be septic.  His white  "count is quite elevated however so it is a little bit of a mixed picture.  His x-ray does not show any evidence of fracture or dislocation.  I did offer him further workup, treatment, but he would like to go home and follow-up with Ortho as an outpatient.  Orthopedic referral placed, the management plan was discussed in detail with the patient at bedside and all questions were answered. Strict ED return instructions were discussed at bedside. Prior to discharge, both verbal and written instructions were provided. We discussed the signs and symptoms that should prompt the patient to return to the ED. All questions were answered and the patient was comfortable with the plan of care and discharged home. The patient agrees to return to the Emergency Department for concerns and/or progression of illness.    Portions of the above record have been created with voice recognition software.  Occasional wrong word or \"sound alike\" substitutions may have occurred due to the inherent limitations of voice recognition software.  Read the chart carefully and recognize, using context, where substitutions may have occurred.    ED Course  ED Course as of 03/02/25 1646   Sun Mar 02, 2025   1536 WBC(!): 17.61         Critical Care Time  Procedures      "

## 2025-03-02 NOTE — ED PROVIDER NOTES
Time reflects when diagnosis was documented in both MDM as applicable and the Disposition within this note       Time User Action Codes Description Comment    3/2/2025  4:46 PM Cristiano Mota Add [M79.642] Left hand pain           ED Disposition       ED Disposition   Discharge    Condition   Stable    Date/Time   Sun Mar 2, 2025  4:46 PM    Comment   Uche NORWOOD Hortencia discharge to home/self care.                   Assessment & Plan       Medical Decision Making  Ddx: traumatic arthritis, septic arthritis, fracture    Xray with no acute fracture.  Elevated WBC but negative CRP and ESR. Pt able to flex and extend the MCP. doubt septic arthritis.   Given tylenol in the ED.   Referral sent for Hand surgery follow up .     Amount and/or Complexity of Data Reviewed  Independent Historian: spouse  Labs: ordered. Decision-making details documented in ED Course.  Radiology: ordered and independent interpretation performed.    Risk  OTC drugs.        ED Course as of 03/02/25 2118   [place] Mar 02, 2025   1607 C-REACTIVE PROTEIN: 1.2  WNL   1609 Basic metabolic panel  WNL   1609 WBC(!): 17.61   1649 Sed Rate: 2  WNL       Medications   acetaminophen (TYLENOL) tablet 975 mg (975 mg Oral Given 3/2/25 1457)       ED Risk Strat Scores                                                History of Present Illness       Chief Complaint   Patient presents with    Hand Injury     Left hand pain states he was cracking his knuckles Friday night and now it is sore and swollen       Past Medical History:   Diagnosis Date    Arthritis       Past Surgical History:   Procedure Laterality Date    LUMBAR FUSION Bilateral 1/18/2024    Procedure: NAVIGATED L4-5 DECOMPRESSION/LAMINECTOMY WITH INSTRUMENTED FUSION, TLIF;  Surgeon: Jamey Altman MD;  Location: [facility];  Service: Orthopedics    ROTATOR CUFF REPAIR        History reviewed. No pertinent family history.   Social History     Tobacco Use    Smoking status: Former     Types: Cigarettes    Tobacco  comments:     Quit smoking 2 months ago   Vaping Use    Vaping status: Never Used   Substance Use Topics    Alcohol use: Not Currently     Comment: quit 25 years ago    Drug use: No      E-Cigarette/Vaping    E-Cigarette Use Never User       E-Cigarette/Vaping Substances    Nicotine No     THC No     CBD No     Flavoring No     Other No     Unknown No       I have reviewed and agree with the history as documented.     The patient is a 59 year old male who presents to ED with his wife for evaluation of hand pain. Pt states he cracked his knuckles 2 days ago and following that he has had worsening pain and swelling to the left second digit MCP joint. Pt is left handed. He has not tried anything at home for pain. Denies fever, arm pain        Review of Systems   Constitutional:  Negative for fever.   Musculoskeletal:  Positive for joint swelling.   Neurological:  Negative for numbness.           Objective       ED Triage Vitals   Temperature Pulse Blood Pressure Respirations SpO2 Patient Position - Orthostatic VS   03/02/25 1414 03/02/25 1414 03/02/25 1414 03/02/25 1414 03/02/25 1414 --   97.8 °F (36.6 °C) 84 124/81 16 98 %       Temp Source Heart Rate Source BP Location FiO2 (%) Pain Score    03/02/25 1414 -- -- -- 03/02/25 1457    Oral    7      Vitals      Date and Time Temp Pulse SpO2 Resp BP Pain Score FACES Pain Rating User   03/02/25 1523 -- -- -- -- -- 7 provider made aware -- AG   03/02/25 1457 -- -- -- -- -- 7 --    03/02/25 1414 97.8 °F (36.6 °C) 84 98 % 16 124/81 -- -- LC            Physical Exam  Vitals and nursing note reviewed.   Constitutional:       General: He is not in acute distress.     Appearance: Normal appearance.   HENT:      Head: Normocephalic and atraumatic.   Cardiovascular:      Rate and Rhythm: Normal rate and regular rhythm.      Pulses: Normal pulses.      Heart sounds: Normal heart sounds.   Pulmonary:      Effort: Pulmonary effort is normal. No respiratory distress.      Breath  sounds: Normal breath sounds.   Abdominal:      General: Abdomen is flat.      Palpations: Abdomen is soft.      Tenderness: There is no abdominal tenderness.   Musculoskeletal:         General: Swelling and tenderness present. No deformity.      Cervical back: Neck supple.      Comments: Erythema, tenderness, and swelling to the left second MCP joint. Able to flex and extend with some pain.    Skin:     General: Skin is warm and dry.      Capillary Refill: Capillary refill takes less than 2 seconds.   Neurological:      General: No focal deficit present.      Mental Status: He is alert and oriented to person, place, and time.   Psychiatric:         Mood and Affect: Mood normal.         Behavior: Behavior normal.         Thought Content: Thought content normal.         Judgment: Judgment normal.         Results Reviewed       Procedure Component Value Units Date/Time    Sedimentation rate, automated [[id number]]  (Normal) Collected: 03/02/25 1501    Lab Status: Final result Specimen: Blood from Arm, Right Updated: 03/02/25 1624     Sed Rate 2 mm/hour     Basic metabolic panel [[id number]] Collected: 03/02/25 1501    Lab Status: Final result Specimen: Blood from Arm, Right Updated: 03/02/25 1605     Sodium 138 mmol/L      Potassium 5.1 mmol/L      Chloride 105 mmol/L      CO2 25 mmol/L      ANION GAP 8 mmol/L      BUN 19 mg/dL      Creatinine 0.87 mg/dL      Glucose 105 mg/dL      Calcium 9.3 mg/dL      eGFR 95 ml/min/1.73sq m     Narrative:      National Kidney Disease Foundation guidelines for Chronic Kidney Disease (CKD):     Stage 1 with normal or high GFR (GFR > 90 mL/min/1.73 square meters)    Stage 2 Mild CKD (GFR = 60-89 mL/min/1.73 square meters)    Stage 3A Moderate CKD (GFR = 45-59 mL/min/1.73 square meters)    Stage 3B Moderate CKD (GFR = 30-44 mL/min/1.73 square meters)    Stage 4 Severe CKD (GFR = 15-29 mL/min/1.73 square meters)    Stage 5 End Stage CKD (GFR <15 mL/min/1.73 square meters)  Note: GFR  calculation is accurate only with a steady state creatinine    C-reactive protein [[id number]]  (Normal) Collected: 03/02/25 1501    Lab Status: Final result Specimen: Blood from Arm, Right Updated: 03/02/25 1605     CRP 1.2 mg/L     CBC and differential [[id number]]  (Abnormal) Collected: 03/02/25 1501    Lab Status: Final result Specimen: Blood from Arm, Right Updated: 03/02/25 1535     WBC 17.61 Thousand/uL      RBC 4.76 Million/uL      Hemoglobin 14.2 g/dL      Hematocrit 42.4 %      MCV 89 fL      MCH 29.8 pg      MCHC 33.5 g/dL      RDW 13.7 %      MPV 10.2 fL      Platelets 336 Thousands/uL      nRBC 0 /100 WBCs      Segmented % 62 %      Immature Grans % 0 %      Lymphocytes % 26 %      Monocytes % 10 %      Eosinophils Relative 1 %      Basophils Relative 1 %      Absolute Neutrophils 10.87 Thousands/µL      Absolute Immature Grans 0.07 Thousand/uL      Absolute Lymphocytes 4.62 Thousands/µL      Absolute Monocytes 1.70 Thousand/µL      Eosinophils Absolute 0.25 Thousand/µL      Basophils Absolute 0.10 Thousands/µL             XR hand 3+ views LEFT   ED Interpretation by Yudi Lucero MD (03/02 1610)   No fracture          Procedures    ED Medication and Procedure Management   Prior to Admission Medications   Prescriptions Last Dose Informant Patient Reported? Taking?   meloxicam (MOBIC) 15 mg tablet   Yes No   Sig: Take 15 mg by mouth daily   methocarbamol (Robaxin-750) 750 mg tablet   No No   Sig: Take 1 tablet (750 mg total) by mouth every 6 (six) hours as needed for muscle spasms for up to 8 days   oxyCODONE (Roxicodone) 5 immediate release tablet   No No   Sig: Take 1 tablet (5 mg total) by mouth every 6 (six) hours as needed for severe pain for up to 20 doses Max Daily Amount: 20 mg      Facility-Administered Medications: None     Discharge Medication List as of 3/2/2025  4:55 PM        START taking these medications    Details   Diclofenac Sodium (VOLTAREN) 1 % Apply 2 g topically 4 (four) times  a day, Starting Sun 3/2/2025, Normal           CONTINUE these medications which have NOT CHANGED    Details   meloxicam (MOBIC) 15 mg tablet Take 15 mg by mouth daily, Starting Thu 2/9/2023, Historical Med      methocarbamol (Robaxin-750) 750 mg tablet Take 1 tablet (750 mg total) by mouth every 6 (six) hours as needed for muscle spasms for up to 8 days, Starting Wed 2/21/2024, Until Thu 2/29/2024 at 2359, Normal      oxyCODONE (Roxicodone) 5 immediate release tablet Take 1 tablet (5 mg total) by mouth every 6 (six) hours as needed for severe pain for up to 20 doses Max Daily Amount: 20 mg, Starting Sun 1/21/2024, Normal             ED SEPSIS DOCUMENTATION   Time reflects when diagnosis was documented in both MDM as applicable and the Disposition within this note       Time User Action Codes Description Comment    3/2/2025  4:46 PM Cristiano Mota Add [M79.642] Left hand pain                  [facility] Barbi Lucero MD  03/07/25 0741

## 2025-03-02 NOTE — ED NOTES
"Upon arrival to pt's room, this RN explained that blood was ordered and per verbal provider order, IV placement as well. Pt voiced frustration and requested IV not be put in the hand. This RN attempted IV cannulation once, but vein infiltrated as soon as IV needle punctured vein. This RN explained what happened to pt. Pt visibly annoyed. This RN asked pt if he'd be okay if this RN attempted IV cannulation once more. Pt agreeable. Once RN started to clean new IV site, pt states, \"woah, wait a second. Why are you trying down there? You are the only one that hasn't gone up here\" gesturing to the antecubital. This RN explained that pt has great veins and pt would be able to flex at the elbow comfortably if the IV isn't in the antecubital region. Pt agreeable to new IV site and states, \"okay, fine. I don't know anything about this stuff.\" This RN asks pt if he would rather have the IV in the antecubital instead. Pt denied antecubital placement. Successful IV placed in the lateral right forearm. Pt did not make eye contact and did not speak to RN after IV placement despite a few attempts to ask patient if there was anything else RN could do or if pt was comfortable. RN updated pt and wife on next steps in the process and exited the room.      Ana María Benitez RN  03/02/25 5780    "

## 2025-03-02 NOTE — DISCHARGE INSTRUCTIONS
You may take 650mg Tylenol (acetaminophen) or 400mg ibuprofen (Motrin/Advil) every 6 hours as needed for pain.      You may use Voltaren gel 4 times per day.    Follow up with orthopedic hand specialist for continued symptoms.   Follow up with your primary doctor for recheck of your WBC count.

## 2025-03-04 NOTE — TELEPHONE ENCOUNTER
Patient arrived for his appointment with Dr Arndt at [facility] and presented [employer] [facility] EPO insurance card. Insurance does verify, patient was advised that [facility] is in network with the plan, however the [facility] or [facility] are not with this insurance plan. Explained that xrays for example would fall under hospital billing and would not be covered at this location.     Patient made no comment to my explanation and walked out of the office before we could offer to reschedule to a provider where the hospital is in network.    Insurance card has been scanned in and correct insurance plan has been added to the patient's guarantor account.

## (undated) DEVICE — DRESSING MEPILEX FOAM BORDER FLEX 4 X 4IN

## (undated) DEVICE — GLOVE INDICATOR PI UNDERGLOVE SZ 7.5 BLUE

## (undated) DEVICE — SCREW 54840047550 4.75 ATS MAS 7.5X50
Type: IMPLANTABLE DEVICE | Site: SPINE LUMBAR | Status: NON-FUNCTIONAL
Brand: CD HORIZON® SPINAL SYSTEM

## (undated) DEVICE — SWABSTCK, BENZOIN TINCTURE, 1/PK, STRL: Brand: APLICARE

## (undated) DEVICE — MARKER REFLECTIVE RADIOPAQUE SPHERE

## (undated) DEVICE — DISPOSABLE EQUIPMENT COVER: Brand: SMALL TOWEL DRAPE

## (undated) DEVICE — INTENDED FOR TISSUE SEPARATION, AND OTHER PROCEDURES THAT REQUIRE A SHARP SURGICAL BLADE TO PUNCTURE OR CUT.: Brand: BARD-PARKER ® CARBON RIB-BACK BLADES

## (undated) DEVICE — SPECIMEN CONTAINER STERILE PEEL PACK

## (undated) DEVICE — SUPPLY FEE STD

## (undated) DEVICE — SPONGE SCRUB 4 PCT CHLORHEXIDINE

## (undated) DEVICE — MONITORING SPINAL IMPULSE CASE FEE

## (undated) DEVICE — PENCIL ELECTROSURG E-Z CLEAN -0035H

## (undated) DEVICE — CHLORAPREP HI-LITE 26ML ORANGE

## (undated) DEVICE — SUT VICRYL 1 CT-1 CR/8 27 IN JJ40G

## (undated) DEVICE — 3M™ TEGADERM™ TRANSPARENT FILM DRESSING FRAME STYLE, 1626W, 4 IN X 4-3/4 IN (10 CM X 12 CM), 50/CT 4CT/CASE: Brand: 3M™ TEGADERM™

## (undated) DEVICE — SURGI KIT INSTRUMENT ORGANIZER

## (undated) DEVICE — PROXIMATE SKIN STAPLERS (35 WIDE) CONTAINS 35 STAINLESS STEEL STAPLES (FIXED HEAD): Brand: PROXIMATE

## (undated) DEVICE — ELECTRODE BLADE MOD E-Z CLEAN 2.5IN 6.4CM -0012M

## (undated) DEVICE — JP CHAN DRN SIL HUBLESS 19FR W/TRO: Brand: CARDINAL HEALTH

## (undated) DEVICE — JACKSON TABLE FOAM POSITIONING KIT: Brand: CARDINAL HEALTH

## (undated) DEVICE — DRAPE SHEET THREE QUARTER

## (undated) DEVICE — DRAPE LAPAROTOMY W/POUCHES

## (undated) DEVICE — TOOL MR8-14MH30 MR8 14CM MATCH 3MM: Brand: MIDAS REX MR8

## (undated) DEVICE — MEDI-VAC YANK SUCT HNDL W/TPRD BULBOUS TIP: Brand: CARDINAL HEALTH

## (undated) DEVICE — TRAY FOLEY 16FR URIMETER SURESTEP

## (undated) DEVICE — BETHLEHEM UNIVERSAL SPINE, KIT: Brand: CARDINAL HEALTH

## (undated) DEVICE — DRESSING MEPILEX AG BORDER POST-OP 4 X 8 IN

## (undated) DEVICE — GLOVE SRG BIOGEL 7.5

## (undated) DEVICE — NEURO PATTIES 1/2 X 3

## (undated) DEVICE — INTENDED FOR TISSUE SEPARATION, AND OTHER PROCEDURES THAT REQUIRE A SHARP SURGICAL BLADE TO PUNCTURE OR CUT.: Brand: BARD-PARKER SAFETY BLADES SIZE 10, STERILE

## (undated) DEVICE — HEMOSTATIC MATRIX SURGIFLO 8ML W/THROMBIN

## (undated) DEVICE — DRAPE SURGIKIT SADDLE BAG

## (undated) DEVICE — ANTIBACTERIAL VIOLET BRAIDED (POLYGLACTIN 910), SYNTHETIC ABSORBABLE SUTURE: Brand: COATED VICRYL

## (undated) DEVICE — LIGHT HANDLE COVER SLEEVE DISP BLUE STELLAR

## (undated) DEVICE — DRESSING MEPILEX FOAM BORDER SACRUM 6.3 X 7.9IN